# Patient Record
Sex: MALE | Race: WHITE | Employment: FULL TIME | URBAN - NONMETROPOLITAN AREA
[De-identification: names, ages, dates, MRNs, and addresses within clinical notes are randomized per-mention and may not be internally consistent; named-entity substitution may affect disease eponyms.]

---

## 2017-01-31 ENCOUNTER — TELEPHONE (OUTPATIENT)
Dept: FAMILY MEDICINE CLINIC | Facility: CLINIC | Age: 52
End: 2017-01-31

## 2017-02-01 ENCOUNTER — OFFICE VISIT (OUTPATIENT)
Dept: FAMILY MEDICINE CLINIC | Facility: CLINIC | Age: 52
End: 2017-02-01

## 2017-02-01 ENCOUNTER — MED REC SCAN ONLY (OUTPATIENT)
Dept: FAMILY MEDICINE CLINIC | Facility: CLINIC | Age: 52
End: 2017-02-01

## 2017-02-01 VITALS
SYSTOLIC BLOOD PRESSURE: 140 MMHG | WEIGHT: 255 LBS | DIASTOLIC BLOOD PRESSURE: 80 MMHG | TEMPERATURE: 98 F | BODY MASS INDEX: 36.51 KG/M2 | OXYGEN SATURATION: 98 % | HEIGHT: 70 IN | HEART RATE: 76 BPM

## 2017-02-01 DIAGNOSIS — E03.9 ACQUIRED HYPOTHYROIDISM: ICD-10-CM

## 2017-02-01 DIAGNOSIS — M54.50 ACUTE BILATERAL LOW BACK PAIN WITHOUT SCIATICA: Primary | ICD-10-CM

## 2017-02-01 DIAGNOSIS — K42.9 UMBILICAL HERNIA WITHOUT OBSTRUCTION AND WITHOUT GANGRENE: ICD-10-CM

## 2017-02-01 PROCEDURE — 99204 OFFICE O/P NEW MOD 45 MIN: CPT | Performed by: FAMILY MEDICINE

## 2017-02-01 RX ORDER — LEVOTHYROXINE SODIUM 0.15 MG/1
TABLET ORAL
Refills: 3 | COMMUNITY
Start: 2017-01-27 | End: 2017-08-25

## 2017-02-01 NOTE — PROGRESS NOTES
HPI:    Patient ID: Judie Banda is a 47year old male. Patient presents with:  Establish Care  Back Pain: HAD MRI--- WANTS TO DISCUSS  pt on FMLA  HPI \" I want help with my back\", woke up 3 weeks ago  ,\"couldn't get out of bed\", pt  of Dr Ector Malik [facility]: He is alert and oriented to person, place, and time. He has normal strength. No sensory deficit. Coordination and gait normal.   Reflex Scores:       Tricep reflexes are 2+ on the right side and 2+ on the left side.        Bicep reflexes are 2

## 2017-02-01 NOTE — PATIENT INSTRUCTIONS
I reviewed the patient's MRI report. I advised him that there is no evidence of nerve or spinal cord compression.   Discussed back exercises, use of moist heat up to 15 minutes every 4 hours, patient may continue ibuprofen up to 800 mg 3 times daily with f

## 2017-02-10 ENCOUNTER — TELEPHONE (OUTPATIENT)
Dept: FAMILY MEDICINE CLINIC | Facility: CLINIC | Age: 52
End: 2017-02-10

## 2017-02-28 ENCOUNTER — TELEPHONE (OUTPATIENT)
Dept: FAMILY MEDICINE CLINIC | Facility: CLINIC | Age: 52
End: 2017-02-28

## 2017-03-02 ENCOUNTER — MED REC SCAN ONLY (OUTPATIENT)
Dept: FAMILY MEDICINE CLINIC | Facility: CLINIC | Age: 52
End: 2017-03-02

## 2017-03-06 ENCOUNTER — OFFICE VISIT (OUTPATIENT)
Dept: FAMILY MEDICINE CLINIC | Facility: CLINIC | Age: 52
End: 2017-03-06

## 2017-03-06 VITALS
OXYGEN SATURATION: 98 % | WEIGHT: 260 LBS | SYSTOLIC BLOOD PRESSURE: 122 MMHG | HEART RATE: 78 BPM | BODY MASS INDEX: 37 KG/M2 | TEMPERATURE: 98 F | DIASTOLIC BLOOD PRESSURE: 80 MMHG

## 2017-03-06 DIAGNOSIS — G89.29 CHRONIC BILATERAL LOW BACK PAIN WITHOUT SCIATICA: Primary | ICD-10-CM

## 2017-03-06 DIAGNOSIS — M54.50 CHRONIC BILATERAL LOW BACK PAIN WITHOUT SCIATICA: Primary | ICD-10-CM

## 2017-03-06 PROCEDURE — 99213 OFFICE O/P EST LOW 20 MIN: CPT | Performed by: FAMILY MEDICINE

## 2017-03-06 NOTE — PROGRESS NOTES
HPI:    Patient ID: John Reyes is a 47year old male. Patient presents with:  Low Back Pain: F/U FROM PT--- RELEASE BACK TO WORK--    HPI patient was seen 2/1/17 for low back pain.   He had a previous MRI that did not show any evidence of nerve imping placed in this encounter.        Meds This Visit:  No prescriptions requested or ordered in this encounter    Imaging & Referrals:  None       #[id number]

## 2017-03-06 NOTE — PATIENT INSTRUCTIONS
Patient strongly encouraged to continue daily home exercise program to prevent back problems in the future. Reviewed proper lifting mechanics and posture. Note written for return to work without restrictions.   Follow-up as needed

## 2017-08-23 ENCOUNTER — TELEPHONE (OUTPATIENT)
Dept: FAMILY MEDICINE CLINIC | Facility: CLINIC | Age: 52
End: 2017-08-23

## 2017-08-23 DIAGNOSIS — Z13.220 SCREENING FOR CHOLESTEROL LEVEL: ICD-10-CM

## 2017-08-23 DIAGNOSIS — Z12.5 PROSTATE CANCER SCREENING: ICD-10-CM

## 2017-08-23 DIAGNOSIS — Z13.228 SCREENING FOR METABOLIC DISORDER: ICD-10-CM

## 2017-08-23 DIAGNOSIS — E03.9 ACQUIRED HYPOTHYROIDISM: Primary | ICD-10-CM

## 2017-08-23 NOTE — TELEPHONE ENCOUNTER
No orders in chart. Last OV 3/6/17.     What labs do you want?? Please place orders for labwork on 8/24

## 2017-08-23 NOTE — TELEPHONE ENCOUNTER
LEVOTHYROXINE-  PATIENT IS SCHEDULED FOR BLOOD DRAW ON 8/24-  NO ORDERS IN SYSTEM    PATIENT HAS REQUESTED 30 DAY SUPPLY WITH 11 REFILLS

## 2017-08-24 ENCOUNTER — APPOINTMENT (OUTPATIENT)
Dept: FAMILY MEDICINE CLINIC | Facility: CLINIC | Age: 52
End: 2017-08-24

## 2017-08-24 DIAGNOSIS — Z12.5 PROSTATE CANCER SCREENING: ICD-10-CM

## 2017-08-24 DIAGNOSIS — Z13.220 SCREENING FOR CHOLESTEROL LEVEL: ICD-10-CM

## 2017-08-24 DIAGNOSIS — E03.9 ACQUIRED HYPOTHYROIDISM: ICD-10-CM

## 2017-08-24 DIAGNOSIS — Z13.228 SCREENING FOR METABOLIC DISORDER: ICD-10-CM

## 2017-08-24 PROCEDURE — 80061 LIPID PANEL: CPT | Performed by: FAMILY MEDICINE

## 2017-08-24 PROCEDURE — 84439 ASSAY OF FREE THYROXINE: CPT | Performed by: FAMILY MEDICINE

## 2017-08-24 PROCEDURE — 36415 COLL VENOUS BLD VENIPUNCTURE: CPT | Performed by: FAMILY MEDICINE

## 2017-08-24 PROCEDURE — 80053 COMPREHEN METABOLIC PANEL: CPT | Performed by: FAMILY MEDICINE

## 2017-08-24 PROCEDURE — 84153 ASSAY OF PSA TOTAL: CPT | Performed by: FAMILY MEDICINE

## 2017-08-24 PROCEDURE — 84443 ASSAY THYROID STIM HORMONE: CPT | Performed by: FAMILY MEDICINE

## 2018-02-13 ENCOUNTER — APPOINTMENT (OUTPATIENT)
Dept: LAB | Age: 53
End: 2018-02-13
Attending: FAMILY MEDICINE
Payer: COMMERCIAL

## 2018-02-13 DIAGNOSIS — E03.9 HYPOTHYROIDISM, UNSPECIFIED TYPE: ICD-10-CM

## 2018-02-13 DIAGNOSIS — R74.8 ELEVATED LIVER ENZYMES: ICD-10-CM

## 2018-02-13 LAB
ALT SERPL-CCNC: 76 U/L (ref 17–63)
AST SERPL-CCNC: 49 U/L (ref 15–41)
TSI SER-ACNC: 3.09 MIU/ML (ref 0.35–5.5)

## 2018-02-13 PROCEDURE — 84450 TRANSFERASE (AST) (SGOT): CPT | Performed by: FAMILY MEDICINE

## 2018-02-13 PROCEDURE — 84460 ALANINE AMINO (ALT) (SGPT): CPT | Performed by: FAMILY MEDICINE

## 2018-02-13 PROCEDURE — 36415 COLL VENOUS BLD VENIPUNCTURE: CPT | Performed by: FAMILY MEDICINE

## 2018-02-13 PROCEDURE — 84443 ASSAY THYROID STIM HORMONE: CPT | Performed by: FAMILY MEDICINE

## 2018-02-14 ENCOUNTER — TELEPHONE (OUTPATIENT)
Dept: FAMILY MEDICINE CLINIC | Facility: CLINIC | Age: 53
End: 2018-02-14

## 2018-02-14 DIAGNOSIS — R74.8 ELEVATED LIVER ENZYMES: ICD-10-CM

## 2018-02-14 DIAGNOSIS — E03.9 HYPOTHYROIDISM, UNSPECIFIED TYPE: Primary | ICD-10-CM

## 2018-02-14 RX ORDER — LEVOTHYROXINE SODIUM 0.15 MG/1
150 TABLET ORAL
Qty: 90 TABLET | Refills: 1 | Status: SHIPPED | OUTPATIENT
Start: 2018-02-14 | End: 2018-08-26

## 2018-02-14 NOTE — TELEPHONE ENCOUNTER
Spoke with Neal @ [facility]. They are NOT able to add Hep B and C tests because they need a gold tube. PC to pt----advised of all results. Asked him to schedule a lab appt for add'l labswork.   Pt states he will not come in for those OR have an ultrasound

## 2018-02-14 NOTE — TELEPHONE ENCOUNTER
[facility]   LAB RESULT   MRN: [id number] Description: Male : 1965   PRINTABLE VERSION (Excludes Result Notes)     AST (SGOT) (Order #[id number]) on 18   Result    AST (SGOT) (Order [id number])   AST (SGOT)   Order: [id number]   Collected: 02/13/18 0801 Release Laura segal C[place] IWGYV: [id number]   02/13/18 1859 Result Lab, Background User Final   Reprint [facility] Order     AST (SGOT) (Order #[id number]) on 2/13/18   Reprint [facility] Order     AST (SGOT) (Order #[id number]) on 2/13/18   Repri

## 2018-03-20 ENCOUNTER — PATIENT OUTREACH (OUTPATIENT)
Dept: FAMILY MEDICINE CLINIC | Facility: CLINIC | Age: 53
End: 2018-03-20

## 2018-06-25 ENCOUNTER — OFFICE VISIT (OUTPATIENT)
Dept: FAMILY MEDICINE CLINIC | Facility: CLINIC | Age: 53
End: 2018-06-25

## 2018-06-25 VITALS
SYSTOLIC BLOOD PRESSURE: 120 MMHG | BODY MASS INDEX: 38.35 KG/M2 | RESPIRATION RATE: 16 BRPM | HEART RATE: 68 BPM | HEIGHT: 68.5 IN | DIASTOLIC BLOOD PRESSURE: 82 MMHG | TEMPERATURE: 98 F | WEIGHT: 256 LBS

## 2018-06-25 DIAGNOSIS — E66.09 CLASS 2 OBESITY DUE TO EXCESS CALORIES WITHOUT SERIOUS COMORBIDITY WITH BODY MASS INDEX (BMI) OF 38.0 TO 38.9 IN ADULT: ICD-10-CM

## 2018-06-25 DIAGNOSIS — K76.0 FATTY LIVER: Primary | ICD-10-CM

## 2018-06-25 DIAGNOSIS — E03.9 HYPOTHYROIDISM, UNSPECIFIED TYPE: ICD-10-CM

## 2018-06-25 PROBLEM — E66.812 CLASS 2 OBESITY DUE TO EXCESS CALORIES WITHOUT SERIOUS COMORBIDITY WITH BODY MASS INDEX (BMI) OF 38.0 TO 38.9 IN ADULT: Status: ACTIVE | Noted: 2018-06-25

## 2018-06-25 PROCEDURE — 99214 OFFICE O/P EST MOD 30 MIN: CPT | Performed by: FAMILY MEDICINE

## 2018-06-25 NOTE — PROGRESS NOTES
Deb Reyes is a 47year old male.   HPI:   Neno Payne is here for get established visit, he has some thyroid issues, and also had a back issue a year ago, had imaging and had some bulging discs, had therapy, there  WAS DOING BETTER. , he has a bump in the calories without serious comorbidity with body mass index (bmi) of 38.0 to 38.9 in adult    HE NEEDS TO CUT OUT THE LITER OF [place] HE DRINKS DAILY and will liklely lose 10 pounds  We talked about cutting out the garbage in his diet and he may ose a

## 2018-07-03 ENCOUNTER — TELEPHONE (OUTPATIENT)
Dept: FAMILY MEDICINE CLINIC | Facility: CLINIC | Age: 53
End: 2018-07-03

## 2018-07-03 ENCOUNTER — NURSE ONLY (OUTPATIENT)
Dept: FAMILY MEDICINE CLINIC | Facility: CLINIC | Age: 53
End: 2018-07-03

## 2018-07-03 DIAGNOSIS — E66.09 CLASS 2 OBESITY DUE TO EXCESS CALORIES WITHOUT SERIOUS COMORBIDITY WITH BODY MASS INDEX (BMI) OF 38.0 TO 38.9 IN ADULT: ICD-10-CM

## 2018-07-03 DIAGNOSIS — E03.9 HYPOTHYROIDISM, UNSPECIFIED TYPE: ICD-10-CM

## 2018-07-03 DIAGNOSIS — K76.0 FATTY LIVER: Primary | ICD-10-CM

## 2018-07-03 PROCEDURE — 86706 HEP B SURFACE ANTIBODY: CPT | Performed by: FAMILY MEDICINE

## 2018-07-03 PROCEDURE — 87340 HEPATITIS B SURFACE AG IA: CPT | Performed by: FAMILY MEDICINE

## 2018-07-03 PROCEDURE — 86225 DNA ANTIBODY NATIVE: CPT | Performed by: FAMILY MEDICINE

## 2018-07-03 PROCEDURE — 84443 ASSAY THYROID STIM HORMONE: CPT | Performed by: FAMILY MEDICINE

## 2018-07-03 PROCEDURE — 80061 LIPID PANEL: CPT | Performed by: FAMILY MEDICINE

## 2018-07-03 PROCEDURE — 84439 ASSAY OF FREE THYROXINE: CPT | Performed by: FAMILY MEDICINE

## 2018-07-03 PROCEDURE — 84153 ASSAY OF PSA TOTAL: CPT | Performed by: FAMILY MEDICINE

## 2018-07-03 PROCEDURE — 86803 HEPATITIS C AB TEST: CPT | Performed by: FAMILY MEDICINE

## 2018-07-03 PROCEDURE — 36415 COLL VENOUS BLD VENIPUNCTURE: CPT | Performed by: FAMILY MEDICINE

## 2018-07-03 PROCEDURE — 86235 NUCLEAR ANTIGEN ANTIBODY: CPT | Performed by: FAMILY MEDICINE

## 2018-07-03 PROCEDURE — 86038 ANTINUCLEAR ANTIBODIES: CPT | Performed by: FAMILY MEDICINE

## 2018-07-03 NOTE — TELEPHONE ENCOUNTER
Returned call to Courtney----left a message on her v/m-----we do not have records of colon screening OR spirometry since he has been seeing us.

## 2018-07-03 NOTE — PROGRESS NOTES
1 gold and 1 mint tube collected from L AC using straight needle and 1 attempt    Pt tolerated and was sent home in stable condition

## 2018-07-04 LAB
CHOLEST SMN-MCNC: 203 MG/DL (ref ?–200)
COMPLEXED PSA SERPL-MCNC: 0.55 NG/ML (ref 0.01–4)
FREE T4: 1.1 NG/DL (ref 0.9–1.8)
HBV SURFACE AB SER QL: NONREACTIVE
HBV SURFACE AB SERPL IA-ACNC: <3.1 MIU/ML
HBV SURFACE AG SERPL QL IA: NONREACTIVE
HDLC SERPL-MCNC: 55 MG/DL (ref 45–?)
HDLC SERPL: 3.69 {RATIO} (ref ?–4.97)
HEPATITIS B SURFACE ANTIGEN INDEX: 0.37
HEPATITIS C VIRUS AB INTERPRETATION: NONREACTIVE
LDLC SERPL CALC-MCNC: 121 MG/DL (ref ?–130)
NONHDLC SERPL-MCNC: 148 MG/DL (ref ?–130)
TRIGL SERPL-MCNC: 135 MG/DL (ref ?–150)
TSI SER-ACNC: 1.77 MIU/ML (ref 0.35–5.5)
VLDLC SERPL CALC-MCNC: 27 MG/DL (ref 5–40)

## 2018-07-05 ENCOUNTER — TELEPHONE (OUTPATIENT)
Dept: FAMILY MEDICINE CLINIC | Facility: CLINIC | Age: 53
End: 2018-07-05

## 2018-07-05 DIAGNOSIS — E66.09 CLASS 2 OBESITY DUE TO EXCESS CALORIES WITHOUT SERIOUS COMORBIDITY WITH BODY MASS INDEX (BMI) OF 38.0 TO 38.9 IN ADULT: ICD-10-CM

## 2018-07-05 DIAGNOSIS — K76.0 FATTY LIVER: ICD-10-CM

## 2018-07-05 DIAGNOSIS — E03.9 HYPOTHYROIDISM, UNSPECIFIED TYPE: Primary | ICD-10-CM

## 2018-07-05 NOTE — TELEPHONE ENCOUNTER
Delmy Baeza, Felipe Hayes Nurse             Can notify Jeisonluca Ortiz that his labs actually are not terrible, cholesterol could be better, so diet and exercise as we had discussed.  He does have a fatty liver, which is a direct result of his diet and dangelo

## 2018-07-05 NOTE — TELEPHONE ENCOUNTER
----- Message from Cheryl Gonsalez DO sent at 7/4/2018  7:55 AM CDT -----  CN we add Cathy with 9 reflex?

## 2018-07-06 LAB
ANA SCREEN: POSITIVE
CENTROMERE AUTOAB: <100 AU/ML (ref ?–100)
DSDNA AUTOAB: <100 IU/ML (ref ?–100)
HISTONE AUTOAB: <100 AU/ML (ref ?–100)
JO-1 AUTOAB: <100 AU/ML (ref ?–100)
RNP AUTOAB: <100 AU/ML (ref ?–100)
SCL-70 AUTOAB: <100 AU/ML (ref ?–100)
SM AUTOAB (SMITH): <100 AU/ML (ref ?–100)
SSA AUTOAB: 163 AU/ML (ref ?–100)
SSB AUTOAB: <100 AU/ML (ref ?–100)

## 2018-07-09 ENCOUNTER — TELEPHONE (OUTPATIENT)
Dept: FAMILY MEDICINE CLINIC | Facility: CLINIC | Age: 53
End: 2018-07-09

## 2018-07-09 NOTE — TELEPHONE ENCOUNTER
----- Message from Leatha Green DO sent at 7/9/2018  2:27 PM CDT -----  Can notify Amanda James that one of his tests came back positive that might possibly explain why his Liver function tests are elevated, but I'm not sure it is Positive Enough, to be respon

## 2018-07-09 NOTE — TELEPHONE ENCOUNTER
Pt advised of results and recommendations. Pt verbalized understanding.     PT was given number to schedule with Dr. Jessy Moncada

## 2018-08-08 ENCOUNTER — TELEPHONE (OUTPATIENT)
Dept: FAMILY MEDICINE CLINIC | Facility: CLINIC | Age: 53
End: 2018-08-08

## 2018-08-08 NOTE — TELEPHONE ENCOUNTER
Pt was in Seen at [facility] yesterday. He fell in the bathroom and hit his head. He has been having dizzy spells. He is coming in 8-16-18 to do a f/u and get the stitches in his head removed.

## 2018-08-08 NOTE — TELEPHONE ENCOUNTER
LM for wife to please call back- want to make sure pt is not having any dizziness or lightheadedness at this time.

## 2018-08-16 ENCOUNTER — TELEPHONE (OUTPATIENT)
Dept: FAMILY MEDICINE CLINIC | Facility: CLINIC | Age: 53
End: 2018-08-16

## 2018-08-16 ENCOUNTER — OFFICE VISIT (OUTPATIENT)
Dept: FAMILY MEDICINE CLINIC | Facility: CLINIC | Age: 53
End: 2018-08-16
Payer: COMMERCIAL

## 2018-08-16 VITALS
DIASTOLIC BLOOD PRESSURE: 86 MMHG | OXYGEN SATURATION: 99 % | BODY MASS INDEX: 37 KG/M2 | TEMPERATURE: 98 F | SYSTOLIC BLOOD PRESSURE: 120 MMHG | WEIGHT: 248.81 LBS | HEART RATE: 78 BPM | RESPIRATION RATE: 18 BRPM

## 2018-08-16 DIAGNOSIS — S01.01XA LACERATION OF SCALP, INITIAL ENCOUNTER: Primary | ICD-10-CM

## 2018-08-16 DIAGNOSIS — R55 SYNCOPE, UNSPECIFIED SYNCOPE TYPE: ICD-10-CM

## 2018-08-16 DIAGNOSIS — R42 VERTIGO: ICD-10-CM

## 2018-08-16 DIAGNOSIS — E66.09 CLASS 2 OBESITY DUE TO EXCESS CALORIES WITHOUT SERIOUS COMORBIDITY WITH BODY MASS INDEX (BMI) OF 38.0 TO 38.9 IN ADULT: ICD-10-CM

## 2018-08-16 PROCEDURE — 99214 OFFICE O/P EST MOD 30 MIN: CPT | Performed by: FAMILY MEDICINE

## 2018-08-16 PROCEDURE — 93000 ELECTROCARDIOGRAM COMPLETE: CPT | Performed by: FAMILY MEDICINE

## 2018-08-16 NOTE — PROGRESS NOTES
Tracy Osgood is a 47year old male.   HPI:   Nila Bhatia was sitting in the bathroom trying to have a BM when he thinks he passed out and struck his head and had to have stitches, he did  Not feel well the day prior and earlier, he had no nausea with the dizz this Visit:  No prescriptions requested or ordered in this encounter    Imaging & Consults:  ELECTROCARDIOGRAM, COMPLETE  CARD ECHO STRESS ECHO/REST AND STRESS(CPT=93350/36308 DM [id number])     The patient indicates understanding of these issues and agrees to

## 2018-08-27 RX ORDER — LEVOTHYROXINE SODIUM 0.15 MG/1
TABLET ORAL
Qty: 90 TABLET | Refills: 3 | Status: SHIPPED | OUTPATIENT
Start: 2018-08-27 | End: 2019-01-07 | Stop reason: SDUPTHER

## 2018-09-05 ENCOUNTER — HOSPITAL ENCOUNTER (OUTPATIENT)
Dept: CV DIAGNOSTICS | Age: 53
Discharge: HOME | End: 2018-09-05
Attending: FAMILY MEDICINE
Payer: COMMERCIAL

## 2018-09-05 DIAGNOSIS — E66.09 CLASS 2 OBESITY DUE TO EXCESS CALORIES WITHOUT SERIOUS COMORBIDITY WITH BODY MASS INDEX (BMI) OF 38.0 TO 38.9 IN ADULT: ICD-10-CM

## 2018-09-05 DIAGNOSIS — R55 SYNCOPE, UNSPECIFIED SYNCOPE TYPE: ICD-10-CM

## 2018-09-05 DIAGNOSIS — R42 VERTIGO: ICD-10-CM

## 2018-09-05 PROCEDURE — 93350 STRESS TTE ONLY: CPT | Performed by: FAMILY MEDICINE

## 2018-09-05 PROCEDURE — 93017 CV STRESS TEST TRACING ONLY: CPT | Performed by: FAMILY MEDICINE

## 2018-09-05 PROCEDURE — 93018 CV STRESS TEST I&R ONLY: CPT | Performed by: FAMILY MEDICINE

## 2018-09-07 ENCOUNTER — TELEPHONE (OUTPATIENT)
Dept: FAMILY MEDICINE CLINIC | Facility: CLINIC | Age: 53
End: 2018-09-07

## 2018-09-07 NOTE — TELEPHONE ENCOUNTER
----- Message from Terry Jacobs DO sent at 9/7/2018  8:15 AM CDT -----  Can notify Sher Paul, his stress test was negative, but we need to do some additional testing, can he set up a follow up appt to go over plan?

## 2018-10-18 ENCOUNTER — IMMUNIZATION (OUTPATIENT)
Dept: FAMILY MEDICINE CLINIC | Facility: CLINIC | Age: 53
End: 2018-10-18
Payer: COMMERCIAL

## 2018-10-18 DIAGNOSIS — Z23 NEED FOR VACCINATION: ICD-10-CM

## 2018-10-18 PROCEDURE — 90686 IIV4 VACC NO PRSV 0.5 ML IM: CPT | Performed by: FAMILY MEDICINE

## 2018-10-18 PROCEDURE — 90471 IMMUNIZATION ADMIN: CPT | Performed by: FAMILY MEDICINE

## 2019-01-07 ENCOUNTER — OFFICE VISIT (OUTPATIENT)
Dept: FAMILY MEDICINE CLINIC | Facility: CLINIC | Age: 54
End: 2019-01-07
Payer: COMMERCIAL

## 2019-01-07 VITALS
HEIGHT: 70 IN | TEMPERATURE: 99 F | RESPIRATION RATE: 20 BRPM | SYSTOLIC BLOOD PRESSURE: 132 MMHG | WEIGHT: 254.19 LBS | BODY MASS INDEX: 36.39 KG/M2 | HEART RATE: 80 BPM | DIASTOLIC BLOOD PRESSURE: 84 MMHG

## 2019-01-07 DIAGNOSIS — R55 SYNCOPE, UNSPECIFIED SYNCOPE TYPE: Primary | ICD-10-CM

## 2019-01-07 DIAGNOSIS — E66.09 CLASS 2 OBESITY DUE TO EXCESS CALORIES WITHOUT SERIOUS COMORBIDITY WITH BODY MASS INDEX (BMI) OF 38.0 TO 38.9 IN ADULT: ICD-10-CM

## 2019-01-07 PROCEDURE — 99214 OFFICE O/P EST MOD 30 MIN: CPT | Performed by: FAMILY MEDICINE

## 2019-01-07 RX ORDER — CITALOPRAM 10 MG/1
10 TABLET ORAL DAILY
Qty: 30 TABLET | Refills: 1 | Status: SHIPPED | OUTPATIENT
Start: 2019-01-07 | End: 2020-11-23

## 2019-01-07 RX ORDER — LEVOTHYROXINE SODIUM 0.15 MG/1
1 TABLET ORAL DAILY
Refills: 2 | COMMUNITY
Start: 2018-12-12 | End: 2019-05-24

## 2019-01-07 NOTE — PROGRESS NOTES
John Reyes is a 47year old male. HPI:   Adam Tereza is here for follow up on his syncope,?  He describes what sounds like possibly an anxiety attack,he starts to shake and then when he lays down before he feels like he might  Pass, out he is then able cyanosis, clubbing or edema    ASSESSMENT AND PLAN:     Syncope, unspecified syncope type  (primary encounter diagnosis)  Class 2 obesity due to excess calories without serious comorbidity with body mass index (bmi) of 38.0 to 38.9 in adult     if these ep

## 2019-02-25 ENCOUNTER — OFFICE VISIT (OUTPATIENT)
Dept: FAMILY MEDICINE CLINIC | Facility: CLINIC | Age: 54
End: 2019-02-25
Payer: COMMERCIAL

## 2019-02-25 VITALS
RESPIRATION RATE: 16 BRPM | HEIGHT: 70 IN | WEIGHT: 254.81 LBS | BODY MASS INDEX: 36.48 KG/M2 | TEMPERATURE: 99 F | DIASTOLIC BLOOD PRESSURE: 80 MMHG | HEART RATE: 80 BPM | SYSTOLIC BLOOD PRESSURE: 118 MMHG

## 2019-02-25 DIAGNOSIS — Z00.00 ROUTINE HEALTH MAINTENANCE: Primary | ICD-10-CM

## 2019-02-25 PROCEDURE — 99396 PREV VISIT EST AGE 40-64: CPT | Performed by: FAMILY MEDICINE

## 2019-02-25 NOTE — PROGRESS NOTES
Concepcion Nair is a 49year old male who presents for a complete physical exam.   HPI:   Pt complains of nothing at this time, he is due for labs and also colon cancer screening, but doesn;t want the \"funny stuff\"?   .I explained to him that the proces tobacco: Never Used    Alcohol use: No    Drug use: No     Occ: works at [employer]. : . Children: 1. Exercise: none.   Diet: doesn't watch     REVIEW OF SYSTEMS:   GENERAL: feels well otherwise  SKIN: denies any unusual skin lesions  EY fasting Lipids, CMP, CBC and PSA. Pt referred for screening colonoscopy. Pt info handouts given for: exercise, low fat diet, testicular self exam and prostate cancer screening. The patient indicates understanding of these issues and agrees to the plan.   Sven Toribio

## 2019-03-23 ENCOUNTER — TELEPHONE (OUTPATIENT)
Dept: FAMILY MEDICINE CLINIC | Facility: CLINIC | Age: 54
End: 2019-03-23

## 2019-03-23 ENCOUNTER — PATIENT OUTREACH (OUTPATIENT)
Dept: FAMILY MEDICINE CLINIC | Facility: CLINIC | Age: 54
End: 2019-03-23

## 2019-03-23 ENCOUNTER — NURSE ONLY (OUTPATIENT)
Dept: FAMILY MEDICINE CLINIC | Facility: CLINIC | Age: 54
End: 2019-03-23
Payer: COMMERCIAL

## 2019-03-23 DIAGNOSIS — Z12.11 SCREENING FOR MALIGNANT NEOPLASM OF COLON: Primary | ICD-10-CM

## 2019-03-23 DIAGNOSIS — Z00.00 ROUTINE HEALTH MAINTENANCE: ICD-10-CM

## 2019-03-23 LAB
ALBUMIN SERPL-MCNC: 3.9 G/DL (ref 3.4–5)
ALBUMIN/GLOB SERPL: 1 {RATIO} (ref 1–2)
ALP LIVER SERPL-CCNC: 101 U/L (ref 45–117)
ALT SERPL-CCNC: 77 U/L (ref 16–61)
ANION GAP SERPL CALC-SCNC: 6 MMOL/L (ref 0–18)
AST SERPL-CCNC: 44 U/L (ref 15–37)
BASOPHILS # BLD AUTO: 0.03 X10(3) UL (ref 0–0.2)
BASOPHILS NFR BLD AUTO: 0.4 %
BILIRUB SERPL-MCNC: 0.6 MG/DL (ref 0.1–2)
BUN BLD-MCNC: 17 MG/DL (ref 7–18)
BUN/CREAT SERPL: 17.7 (ref 10–20)
CALCIUM BLD-MCNC: 8.9 MG/DL (ref 8.5–10.1)
CHLORIDE SERPL-SCNC: 107 MMOL/L (ref 98–107)
CHOLEST SMN-MCNC: 204 MG/DL (ref ?–200)
CO2 SERPL-SCNC: 28 MMOL/L (ref 21–32)
COMPLEXED PSA SERPL-MCNC: 0.75 NG/ML (ref ?–4)
CREAT BLD-MCNC: 0.96 MG/DL (ref 0.7–1.3)
DEPRECATED RDW RBC AUTO: 43.7 FL (ref 35.1–46.3)
EOSINOPHIL # BLD AUTO: 0.35 X10(3) UL (ref 0–0.7)
EOSINOPHIL NFR BLD AUTO: 4.8 %
ERYTHROCYTE [DISTWIDTH] IN BLOOD BY AUTOMATED COUNT: 13.1 % (ref 11–15)
GLOBULIN PLAS-MCNC: 3.9 G/DL (ref 2.8–4.4)
GLUCOSE BLD-MCNC: 91 MG/DL (ref 70–99)
HCT VFR BLD AUTO: 47.7 % (ref 39–53)
HDLC SERPL-MCNC: 50 MG/DL (ref 40–59)
HGB BLD-MCNC: 16.1 G/DL (ref 13–17.5)
IMM GRANULOCYTES # BLD AUTO: 0.03 X10(3) UL (ref 0–1)
IMM GRANULOCYTES NFR BLD: 0.4 %
LDLC SERPL CALC-MCNC: 127 MG/DL (ref ?–100)
LYMPHOCYTES # BLD AUTO: 2.58 X10(3) UL (ref 1–4)
LYMPHOCYTES NFR BLD AUTO: 35.4 %
M PROTEIN MFR SERPL ELPH: 7.8 G/DL (ref 6.4–8.2)
MCH RBC QN AUTO: 30.8 PG (ref 26–34)
MCHC RBC AUTO-ENTMCNC: 33.8 G/DL (ref 31–37)
MCV RBC AUTO: 91.4 FL (ref 80–100)
MONOCYTES # BLD AUTO: 0.56 X10(3) UL (ref 0.1–1)
MONOCYTES NFR BLD AUTO: 7.7 %
NEUTROPHILS # BLD AUTO: 3.73 X10 (3) UL (ref 1.5–7.7)
NEUTROPHILS # BLD AUTO: 3.73 X10(3) UL (ref 1.5–7.7)
NEUTROPHILS NFR BLD AUTO: 51.3 %
NONHDLC SERPL-MCNC: 154 MG/DL (ref ?–130)
OSMOLALITY SERPL CALC.SUM OF ELEC: 293 MOSM/KG (ref 275–295)
PLATELET # BLD AUTO: 237 10(3)UL (ref 150–450)
POTASSIUM SERPL-SCNC: 3.9 MMOL/L (ref 3.5–5.1)
RBC # BLD AUTO: 5.22 X10(6)UL (ref 4.3–5.7)
SODIUM SERPL-SCNC: 141 MMOL/L (ref 136–145)
T4 FREE SERPL-MCNC: 1.2 NG/DL (ref 0.8–1.7)
TRIGL SERPL-MCNC: 137 MG/DL (ref 30–149)
TSI SER-ACNC: 2.3 MIU/ML (ref 0.36–3.74)
VLDLC SERPL CALC-MCNC: 27 MG/DL (ref 0–30)
WBC # BLD AUTO: 7.3 X10(3) UL (ref 4–11)

## 2019-03-23 PROCEDURE — 36415 COLL VENOUS BLD VENIPUNCTURE: CPT | Performed by: FAMILY MEDICINE

## 2019-03-23 PROCEDURE — 84439 ASSAY OF FREE THYROXINE: CPT | Performed by: FAMILY MEDICINE

## 2019-03-23 PROCEDURE — 84153 ASSAY OF PSA TOTAL: CPT | Performed by: FAMILY MEDICINE

## 2019-03-23 PROCEDURE — 80061 LIPID PANEL: CPT | Performed by: FAMILY MEDICINE

## 2019-03-23 PROCEDURE — 80050 GENERAL HEALTH PANEL: CPT | Performed by: FAMILY MEDICINE

## 2019-03-23 NOTE — TELEPHONE ENCOUNTER
Patient presented today for lab draw. Would like Dr. Horacio Vazquez to know that he had another episode yesterday. Did not pass out but he felt dizzy and his lower back completely tensed up. States the episode scared his boss so much that his boss drove him home.

## 2019-03-26 ENCOUNTER — TELEPHONE (OUTPATIENT)
Dept: FAMILY MEDICINE CLINIC | Facility: CLINIC | Age: 54
End: 2019-03-26

## 2019-03-26 NOTE — TELEPHONE ENCOUNTER
----- Message from Coleen Doan DO sent at 3/26/2019  9:34 AM CDT -----  ValFreeWavz looked pretty good lipids were actually pretty good he could work on his diet a little more to get his total cholesterol down, he has  Excellent kidney

## 2019-04-03 NOTE — TELEPHONE ENCOUNTER
LM for pt to call back    Have attempted to contact pt 3 times- letter sent to pt to call our office    Closing call

## 2019-04-22 ENCOUNTER — TELEPHONE (OUTPATIENT)
Dept: FAMILY MEDICINE CLINIC | Facility: CLINIC | Age: 54
End: 2019-04-22

## 2019-05-24 ENCOUNTER — TELEPHONE (OUTPATIENT)
Dept: FAMILY MEDICINE CLINIC | Facility: CLINIC | Age: 54
End: 2019-05-24

## 2019-05-24 DIAGNOSIS — E03.9 HYPOTHYROIDISM, UNSPECIFIED TYPE: Primary | ICD-10-CM

## 2019-05-24 RX ORDER — LEVOTHYROXINE SODIUM 0.15 MG/1
150 TABLET ORAL DAILY
Qty: 10 TABLET | Refills: 0 | Status: SHIPPED | OUTPATIENT
Start: 2019-05-24 | End: 2019-07-01

## 2019-05-24 NOTE — TELEPHONE ENCOUNTER
Medication sent to pharmacy. LM for patient's wife advising med sent.  Ok per [employer] form

## 2019-05-24 NOTE — TELEPHONE ENCOUNTER
Coming in for labs on 6/29. No orders in Epic. Last labs were done in 3/26/19. Patient never called back for results.      Future Appointments   Date Time Provider Kya Navarro   6/29/2019  8:15 AM  [facility],2Nd Floor [facility] Anette Bonner

## 2019-05-24 NOTE — TELEPHONE ENCOUNTER
Patient is coming in on the 29th for lab work but won't have enough thyroid meds to make it until then. Can we call in a refill to [facility] [place]? Please call wife back.

## 2019-06-29 ENCOUNTER — NURSE ONLY (OUTPATIENT)
Dept: FAMILY MEDICINE CLINIC | Facility: CLINIC | Age: 54
End: 2019-06-29
Payer: COMMERCIAL

## 2019-06-29 DIAGNOSIS — E03.9 HYPOTHYROIDISM, UNSPECIFIED TYPE: ICD-10-CM

## 2019-06-29 PROCEDURE — 84439 ASSAY OF FREE THYROXINE: CPT | Performed by: FAMILY MEDICINE

## 2019-06-29 PROCEDURE — 36415 COLL VENOUS BLD VENIPUNCTURE: CPT | Performed by: FAMILY MEDICINE

## 2019-06-29 PROCEDURE — 84443 ASSAY THYROID STIM HORMONE: CPT | Performed by: FAMILY MEDICINE

## 2019-06-29 NOTE — PROGRESS NOTES
Pt here for thyroid labs-   Mint tube drawn from left arm with straight needle x1. Pt bill well.  Pt left the clinic in stable condition      Pt asks for his levothyroxine to be filled to [facility]    He also asks if he can do his blood work once a

## 2019-07-01 ENCOUNTER — TELEPHONE (OUTPATIENT)
Dept: FAMILY MEDICINE CLINIC | Facility: CLINIC | Age: 54
End: 2019-07-01

## 2019-07-01 RX ORDER — LEVOTHYROXINE SODIUM 0.15 MG/1
150 TABLET ORAL DAILY
Qty: 90 TABLET | Refills: 3 | Status: SHIPPED | OUTPATIENT
Start: 2019-07-01 | End: 2020-05-04

## 2019-07-01 NOTE — TELEPHONE ENCOUNTER
Pt is asking for refill of LEvothyroxine    Per CC chart by Dr. Ricci mooney to refill for a year    Once a year is fine, and ca refill for a year

## 2019-07-01 NOTE — TELEPHONE ENCOUNTER
----- Message from Caty Daniel DO sent at 7/1/2019 12:28 PM CDT -----  Keep th dose the same and recall in 1 year

## 2019-07-24 ENCOUNTER — TELEPHONE (OUTPATIENT)
Dept: FAMILY MEDICINE CLINIC | Facility: CLINIC | Age: 54
End: 2019-07-24

## 2019-07-24 NOTE — TELEPHONE ENCOUNTER
Levothyroxine 150 mcg was refilled on 7/1/19 for #90 with 3 refills. Confirmed with [facility] staff Moise Carvalho) this was picked up on 7/23/19 for #30. Requested that this be prepared for the patient. She will submit for refill.

## 2019-08-19 ENCOUNTER — TELEPHONE (OUTPATIENT)
Dept: FAMILY MEDICINE CLINIC | Facility: CLINIC | Age: 54
End: 2019-08-19

## 2019-08-19 NOTE — TELEPHONE ENCOUNTER
Levothyroxine Sodium 150 MCG Oral Tab   for 6 month   [id number] - [place], [address] [place] [address] ([address]), [phone], [phone]

## 2019-09-23 ENCOUNTER — TELEPHONE (OUTPATIENT)
Dept: FAMILY MEDICINE CLINIC | Facility: CLINIC | Age: 54
End: 2019-09-23

## 2019-09-23 NOTE — TELEPHONE ENCOUNTER
Well have him take eieher 200 mg of magnesium at night, OR, he ca drink a bottle of tonic water before bedtime, the quinine in it helps to stop cramps

## 2019-09-23 NOTE — TELEPHONE ENCOUNTER
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 The cramps usually come at night. Is there something that he can take for this. If so then can you send the RX to [address]

## 2019-09-23 NOTE — TELEPHONE ENCOUNTER
Wife states pt is is getting cramps in R leg- only happens at night- almost every night. Wife states pt has no tenerness, swelling or warmth associated with cramping. Wife states Pt takes Nyquil at night to help him fall asleep.   Wife states when he

## 2019-10-04 ENCOUNTER — IMMUNIZATION (OUTPATIENT)
Dept: FAMILY MEDICINE CLINIC | Facility: CLINIC | Age: 54
End: 2019-10-04
Payer: COMMERCIAL

## 2019-10-04 DIAGNOSIS — Z23 NEED FOR VACCINATION: ICD-10-CM

## 2019-10-04 PROCEDURE — 90686 IIV4 VACC NO PRSV 0.5 ML IM: CPT | Performed by: FAMILY MEDICINE

## 2019-10-04 PROCEDURE — 90471 IMMUNIZATION ADMIN: CPT | Performed by: FAMILY MEDICINE

## 2019-12-28 ENCOUNTER — OFFICE VISIT (OUTPATIENT)
Dept: FAMILY MEDICINE CLINIC | Facility: CLINIC | Age: 54
End: 2019-12-28
Payer: COMMERCIAL

## 2019-12-28 ENCOUNTER — TELEPHONE (OUTPATIENT)
Dept: FAMILY MEDICINE CLINIC | Facility: CLINIC | Age: 54
End: 2019-12-28

## 2019-12-28 VITALS
SYSTOLIC BLOOD PRESSURE: 130 MMHG | TEMPERATURE: 98 F | BODY MASS INDEX: 36 KG/M2 | RESPIRATION RATE: 16 BRPM | DIASTOLIC BLOOD PRESSURE: 78 MMHG | HEART RATE: 78 BPM | OXYGEN SATURATION: 98 % | WEIGHT: 254 LBS

## 2019-12-28 DIAGNOSIS — J40 BRONCHITIS: Primary | ICD-10-CM

## 2019-12-28 PROCEDURE — 99213 OFFICE O/P EST LOW 20 MIN: CPT | Performed by: NURSE PRACTITIONER

## 2019-12-28 RX ORDER — ALBUTEROL SULFATE 90 UG/1
2 AEROSOL, METERED RESPIRATORY (INHALATION) EVERY 4 HOURS PRN
Qty: 1 INHALER | Refills: 0 | Status: SHIPPED | OUTPATIENT
Start: 2019-12-28 | End: 2020-11-23

## 2019-12-28 RX ORDER — PREDNISONE 20 MG/1
20 TABLET ORAL DAILY
Qty: 5 TABLET | Refills: 0 | Status: SHIPPED | OUTPATIENT
Start: 2019-12-28 | End: 2020-01-02

## 2019-12-28 NOTE — TELEPHONE ENCOUNTER
Patient advised. Verbalized understanding. Advised if worsening symptoms or DAISY or SOB needs to go to ER or IC. Verbalized understanding.  Offered to make appt with Dr Teetee Lino for next week-states he will call back

## 2019-12-28 NOTE — PROGRESS NOTES
CHIEF COMPLAINT:   Patient presents with:  Cough: x 1 week. no congestion/fever        HPI:   Aaron Perez is a 49year old male who presents for cough for  1  weeks. Cough started gradually and is described as dry.  Patient denies history of bronchit HENT: Atraumatic, normocephalic. TM's clear bilaterally. Nostrils patent, nasal mucosa pink and non-inflamed. No erythema of the throat. NECK: supple, non-tender. LUNGS: Normal respiratory rate. Normal effort. dry cough. no wheezing.  No rales or crac · Chest discomfort  · Shortness of breath  · Mild fever  A second infection, this time due to bacteria, may then occur. And airways irritated by allergens or smoke are more likely to get infected.   Making a diagnosis  A physical exam, health history, and c Most cases of bronchitis are caused by cold or flu viruses. They don’t need antibiotics to treat them, even if your mucus is thick and green or yellow.  Antibiotics don’t treat viral illness and antibiotics have not been shown to have any benefit in cases o Inside healthy lungs    Air travels in and out of the lungs through the airways. The linings of these airways produce sticky mucus. This mucus traps particles that enter the lungs. Tiny structures called cilia then sweep the particles out of the airways.

## 2019-12-28 NOTE — TELEPHONE ENCOUNTER
Pt called, has had a bad cough for about a week and is wondering if there is anything we can call in for him instead of pt having to come in and see Dr. Melquiades Patton.   Please call pt at [phone]

## 2019-12-28 NOTE — TELEPHONE ENCOUNTER
Call from patient. States he has had a cough for over a week and wanted to know if something could be called in. Advised that DS is out of the office and we would need to see him prior to calling anything in.  Advised can go to  or [facility] over the weekend if

## 2019-12-28 NOTE — TELEPHONE ENCOUNTER
If he has post nasal drip/congestion I would do sudafed in am, benadryl at bedtime. Can do cough drops (honey or menthol), run humidifer, tea with honey.   If feels like has congestion in chest htat isn't coming up can try plain mucinex to help bring it up

## 2020-03-02 ENCOUNTER — OFFICE VISIT (OUTPATIENT)
Dept: FAMILY MEDICINE CLINIC | Facility: CLINIC | Age: 55
End: 2020-03-02
Payer: COMMERCIAL

## 2020-03-02 ENCOUNTER — TELEPHONE (OUTPATIENT)
Dept: FAMILY MEDICINE CLINIC | Facility: CLINIC | Age: 55
End: 2020-03-02

## 2020-03-02 VITALS
OXYGEN SATURATION: 98 % | HEART RATE: 84 BPM | RESPIRATION RATE: 24 BRPM | TEMPERATURE: 98 F | WEIGHT: 268 LBS | HEIGHT: 70 IN | SYSTOLIC BLOOD PRESSURE: 120 MMHG | BODY MASS INDEX: 38.37 KG/M2 | DIASTOLIC BLOOD PRESSURE: 82 MMHG

## 2020-03-02 DIAGNOSIS — R06.01 ORTHOPNEA: ICD-10-CM

## 2020-03-02 DIAGNOSIS — J98.01 BRONCHOSPASM: Primary | ICD-10-CM

## 2020-03-02 DIAGNOSIS — R05.9 COUGH: ICD-10-CM

## 2020-03-02 PROCEDURE — 99214 OFFICE O/P EST MOD 30 MIN: CPT | Performed by: FAMILY MEDICINE

## 2020-03-02 RX ORDER — METHYLPREDNISOLONE 4 MG/1
TABLET ORAL
Qty: 1 KIT | Refills: 0 | Status: SHIPPED | OUTPATIENT
Start: 2020-03-02 | End: 2020-08-22

## 2020-03-02 NOTE — TELEPHONE ENCOUNTER
Pt's spouse Sania Lemus called PT has been wheezing and cough for awhile now. It is keeping him up at night. Pt wants to know if  could send a script in for PT?    Would like sent to [facility] in [place]

## 2020-03-02 NOTE — TELEPHONE ENCOUNTER
Pt coming in at 540 per DS    Future Appointments   Date Time Provider Kya Navarro   3/2/2020  5:40 PM [place], [facility] MARGY Maguire

## 2020-03-03 NOTE — PROGRESS NOTES
HPI:   Ralph Armendariz is a 48year old male who presents for upper respiratory symptoms for  2  months. Patient reports congestion, cough with clear colored sputum, cough is keeping pt up at night, wheezing.  He has gained about 14 pounds, he was given a GENERAL: well developed, well nourished,in no apparent distress  SKIN: no rashes,no suspicious lesions  EYES:PERRLA, EOMI, normal optic disk,conjunctiva are clear  HEENT: atraumatic, normocephalic,ears and throat are clear  NECK: supple,no adenopathy,no

## 2020-05-04 RX ORDER — LEVOTHYROXINE SODIUM 0.15 MG/1
150 TABLET ORAL DAILY
Qty: 90 TABLET | Refills: 3 | Status: SHIPPED | OUTPATIENT
Start: 2020-05-04 | End: 2020-12-08

## 2020-05-04 NOTE — TELEPHONE ENCOUNTER
LOV: 3/2/30   Last Refill:7/1/19 #90 3 RF    No future appointments.     Lab Results   Component Value Date    T4F 1.0 06/29/2019    TSH 1.060 06/29/2019

## 2020-05-04 NOTE — TELEPHONE ENCOUNTER
Spouse called, asking for a refill of pt's thyroid medication. Spouse didn't know the name of the medication.      [facility] [address] [place] [address] [place] [address] ([address]), [phone], [phone]

## 2020-08-20 ENCOUNTER — TELEPHONE (OUTPATIENT)
Dept: FAMILY MEDICINE CLINIC | Facility: CLINIC | Age: 55
End: 2020-08-20

## 2020-08-20 NOTE — TELEPHONE ENCOUNTER
Pt's wife called he woke up from sleeping last night was feeling weird and was  screaming out to high wife \"dont leave me I feel weird\" and he was sweaty, wants to  throw up. He didn't want her to call 911. Said his BP was fine bc she checked it.  [place] we

## 2020-08-20 NOTE — TELEPHONE ENCOUNTER
Wife states last week he was feeling really faint. Pt woke up in the middle of the night- was sitting on the toilet sweatying and \"screaming don't leave me\" pt was super sweaty and clammy. Pt then fell to the floor. Pt had cereal for dinner.   Josiah Duarte

## 2020-08-22 ENCOUNTER — HOSPITAL ENCOUNTER (OUTPATIENT)
Age: 55
Discharge: OTHER INSTITUTION | End: 2020-08-22
Payer: COMMERCIAL

## 2020-08-22 VITALS
HEART RATE: 67 BPM | RESPIRATION RATE: 18 BRPM | SYSTOLIC BLOOD PRESSURE: 157 MMHG | TEMPERATURE: 98 F | DIASTOLIC BLOOD PRESSURE: 85 MMHG | OXYGEN SATURATION: 96 %

## 2020-08-22 DIAGNOSIS — R61 DIAPHORESIS: ICD-10-CM

## 2020-08-22 DIAGNOSIS — R55 SYNCOPE, NEAR: Primary | ICD-10-CM

## 2020-08-22 PROCEDURE — 99202 OFFICE O/P NEW SF 15 MIN: CPT | Performed by: PHYSICIAN ASSISTANT

## 2020-08-22 PROCEDURE — 93000 ELECTROCARDIOGRAM COMPLETE: CPT | Performed by: PHYSICIAN ASSISTANT

## 2020-08-22 NOTE — ED INITIAL ASSESSMENT (HPI)
Wednesday Pt c/o \"feeling odd\", sweating, nausea. Pt states this continues, PCP instructed Pt to go to ER.     Denies: DM, cardiac issues, Pt denies LOC

## 2020-08-22 NOTE — ED PROVIDER NOTES
Patient Seen in: [address] [facility]      History   Patient presents with:  Dizziness  Sweats    Stated Complaint: dizziness, lethargic    HPI    52-year-old male. Medical history of diabetes, thyroid disease and obesity.   Denies any signifi groomed, alert and aware x 3  Neck: Supple, full range of motion, no thyromegaly or lymphadenopathy. Eye examination: EOMs are intact, normal conjunctival  ENT: No injection noted to the bilateral auditory canals; no loss of landmarks.  Normal nasal mucosa

## 2020-08-23 LAB
ATRIAL RATE: 63 BPM
P AXIS: 28 DEGREES
P-R INTERVAL: 164 MS
Q-T INTERVAL: 418 MS
QRS DURATION: 96 MS
QTC CALCULATION (BEZET): 427 MS
R AXIS: -15 DEGREES
T AXIS: 26 DEGREES
VENTRICULAR RATE: 63 BPM

## 2020-08-24 ENCOUNTER — ORDER TRANSCRIPTION (OUTPATIENT)
Dept: ADMINISTRATIVE | Facility: HOSPITAL | Age: 55
End: 2020-08-24

## 2020-08-24 DIAGNOSIS — Z13.9 ENCOUNTER FOR SCREENING: Primary | ICD-10-CM

## 2020-09-02 ENCOUNTER — TELEPHONE (OUTPATIENT)
Dept: FAMILY MEDICINE CLINIC | Facility: CLINIC | Age: 55
End: 2020-09-02

## 2020-09-02 NOTE — TELEPHONE ENCOUNTER
Sister Called- wants to discuss pt since he is still blacking out. Sister is not on verbal release.     Sister was advised and verbalized understanding

## 2020-09-09 ENCOUNTER — TELEPHONE (OUTPATIENT)
Dept: FAMILY MEDICINE CLINIC | Facility: CLINIC | Age: 55
End: 2020-09-09

## 2020-09-09 NOTE — TELEPHONE ENCOUNTER
Spoke with pt wife- advised DS needs to see pt    Future Appointments   Date Time Provider Kya Navarro   9/10/2020  4:20 PM Emmy Adhikari [facility] MARGY Hyatt

## 2020-09-09 NOTE — TELEPHONE ENCOUNTER
KERVIN CALLED AND ADV THAT PT IS STILL FEELING DIZZY AT TIMES. WAS ADV THAT PT HAS GONE TO THE ER AND THEY FOUND NOTHING WRONG. USUALLY WHEN PT GETS UP THIS HAPPENS.     LOOKING FOR RECOMMENDATIONS    PLEASE ADV    THANK YOU

## 2020-09-10 ENCOUNTER — OFFICE VISIT (OUTPATIENT)
Dept: FAMILY MEDICINE CLINIC | Facility: CLINIC | Age: 55
End: 2020-09-10
Payer: COMMERCIAL

## 2020-09-10 VITALS
SYSTOLIC BLOOD PRESSURE: 124 MMHG | HEART RATE: 84 BPM | BODY MASS INDEX: 37 KG/M2 | TEMPERATURE: 98 F | DIASTOLIC BLOOD PRESSURE: 78 MMHG | WEIGHT: 256.38 LBS | RESPIRATION RATE: 20 BRPM

## 2020-09-10 DIAGNOSIS — E66.09 CLASS 2 OBESITY DUE TO EXCESS CALORIES WITHOUT SERIOUS COMORBIDITY WITH BODY MASS INDEX (BMI) OF 38.0 TO 38.9 IN ADULT: ICD-10-CM

## 2020-09-10 DIAGNOSIS — R55 SYNCOPE, UNSPECIFIED SYNCOPE TYPE: Primary | ICD-10-CM

## 2020-09-10 DIAGNOSIS — R42 LIGHTHEADEDNESS: ICD-10-CM

## 2020-09-10 PROCEDURE — 3078F DIAST BP <80 MM HG: CPT | Performed by: FAMILY MEDICINE

## 2020-09-10 PROCEDURE — 99214 OFFICE O/P EST MOD 30 MIN: CPT | Performed by: FAMILY MEDICINE

## 2020-09-10 PROCEDURE — 3074F SYST BP LT 130 MM HG: CPT | Performed by: FAMILY MEDICINE

## 2020-09-10 NOTE — PROGRESS NOTES
Madison Pringle is a 48year old male. HPI:   Rosa Alevism  Is here for discussion of lightheadedness he gets when he has gotten up tp go to the bathroom, he states it lasts about 15 minutes and once it passes he's fine.  He also gets sweaty and get some nausea lesions  HEENT: atraumatic, normocephalic,ears and throat are clear  NECK: supple,no adenopathy,no bruits  LUNGS: clear to auscultation  CARDIO: RRR without murmur  GI: good BS's,no masses, HSM or tenderness  EXTREMITIES: no cyanosis, clubbing or edema

## 2020-11-09 ENCOUNTER — TELEPHONE (OUTPATIENT)
Dept: FAMILY MEDICINE CLINIC | Facility: CLINIC | Age: 55
End: 2020-11-09

## 2020-11-09 DIAGNOSIS — Z00.00 ROUTINE HEALTH MAINTENANCE: Primary | ICD-10-CM

## 2020-11-09 NOTE — TELEPHONE ENCOUNTER
Wife scheduled appt for yearly px, she would like to have Dr. Soco Hays order labs beforehand so they can come in together. Please call wife back to schedule labs if appropriate.        Future Appointments   Date Time Provider Kya Navarro   11/23/2020  4:

## 2020-11-09 NOTE — TELEPHONE ENCOUNTER
[facility] notified. No Saturdays available per her request. Patient will get labs done at the time of OV.

## 2020-11-11 NOTE — PROGRESS NOTES
HPI:   Patient presents with:  Post-Op  Blood Pressure: high in hospital   Abnormal Labs  Weight Loss Gain      Patient Siena Guajardo is a 45year old male who presents for follow-up hospitalization patient with    Recent laparoscopy/gastric sleeve, at Patient presented today for lab draw. 1 mint, 1 lav tube(s) drawn from left ac area x1 with straight needle. Patient tolerated well. Pantoprazole Sodium 40 MG Oral Tab EC, Take 1 tablet (40 mg total) by mouth every morning before breakfast., Disp: 90 tablet, Rfl: 0    No current facility-administered medications on file prior to visit.       Past Medical History:   Diagnosis Date   • Juno tingling or weakness  PSYCH:  No mood concerns     EXAM:   /76   Pulse 83   Ht 69\"   Wt 222 lb 3.2 oz (100.8 kg)   SpO2 96%   BMI 32.81 kg/m²  Body mass index is 32.81 kg/m².   Wt Readings from Last 3 Encounters:  11/11/20 : 222 lb 3.2 oz (100.8 kg) with me pending report and in office in approximately 2 months  · The patient indicates understanding of these recommendtions and agrees to the above plan. Additional Assessment and Plan:  1.  Elevated blood pressure reading without diagnosis of hyperten

## 2020-11-23 ENCOUNTER — OFFICE VISIT (OUTPATIENT)
Dept: FAMILY MEDICINE CLINIC | Facility: CLINIC | Age: 55
End: 2020-11-23
Payer: COMMERCIAL

## 2020-11-23 VITALS
SYSTOLIC BLOOD PRESSURE: 140 MMHG | OXYGEN SATURATION: 100 % | HEART RATE: 98 BPM | WEIGHT: 265.63 LBS | TEMPERATURE: 99 F | DIASTOLIC BLOOD PRESSURE: 90 MMHG | BODY MASS INDEX: 38 KG/M2

## 2020-11-23 DIAGNOSIS — Z00.00 ROUTINE HEALTH MAINTENANCE: Primary | ICD-10-CM

## 2020-11-23 PROCEDURE — 3077F SYST BP >= 140 MM HG: CPT | Performed by: FAMILY MEDICINE

## 2020-11-23 PROCEDURE — 99072 ADDL SUPL MATRL&STAF TM PHE: CPT | Performed by: FAMILY MEDICINE

## 2020-11-23 PROCEDURE — 99396 PREV VISIT EST AGE 40-64: CPT | Performed by: FAMILY MEDICINE

## 2020-11-23 PROCEDURE — 3080F DIAST BP >= 90 MM HG: CPT | Performed by: FAMILY MEDICINE

## 2020-11-23 PROCEDURE — 3008F BODY MASS INDEX DOCD: CPT | Performed by: FAMILY MEDICINE

## 2020-11-23 NOTE — PROGRESS NOTES
Romana Adame is a 48year old male who presents for a complete physical exam.   HPI:   Pt complains of Nothing at this time he is due for labs,  He ate today, he got a flu shot,  And is due for a CPX, and also colon cancer screening, he was supposed t • Obesity       No past surgical history on file. No family history on file.    Social History:  Social History    Tobacco Use      Smoking status: Never Smoker      Smokeless tobacco: Never Used    Alcohol use: No    Drug use: No     Occ: works at [employer] kg/m²., recommended low fat diet and aerobic exercise 30 minutes three times weekly. Health maintenance, will check fasting Lipids, CMP, CBC and PSA. Pt referred for screening colonoscopy.  Pt info handouts given for: exercise, low fat diet, testicular self

## 2020-12-07 ENCOUNTER — NURSE ONLY (OUTPATIENT)
Dept: FAMILY MEDICINE CLINIC | Facility: CLINIC | Age: 55
End: 2020-12-07
Payer: COMMERCIAL

## 2020-12-07 DIAGNOSIS — Z00.00 ROUTINE HEALTH MAINTENANCE: ICD-10-CM

## 2020-12-07 PROCEDURE — 84153 ASSAY OF PSA TOTAL: CPT | Performed by: FAMILY MEDICINE

## 2020-12-07 PROCEDURE — 36415 COLL VENOUS BLD VENIPUNCTURE: CPT | Performed by: FAMILY MEDICINE

## 2020-12-07 PROCEDURE — 80050 GENERAL HEALTH PANEL: CPT | Performed by: FAMILY MEDICINE

## 2020-12-07 PROCEDURE — 80061 LIPID PANEL: CPT | Performed by: FAMILY MEDICINE

## 2020-12-07 NOTE — PROGRESS NOTES
Pt was in office for NV for labs ordered by DS    1 mint and 1 lavender tube collected from [facility] using straight needle and 1 attempt    Pt tolerated and was sent home in stable condition

## 2020-12-08 ENCOUNTER — TELEPHONE (OUTPATIENT)
Dept: FAMILY MEDICINE CLINIC | Facility: CLINIC | Age: 55
End: 2020-12-08

## 2020-12-08 RX ORDER — LEVOTHYROXINE SODIUM 0.15 MG/1
150 TABLET ORAL DAILY
Qty: 90 TABLET | Refills: 3 | Status: SHIPPED | OUTPATIENT
Start: 2020-12-08 | End: 2021-07-13

## 2020-12-08 NOTE — TELEPHONE ENCOUNTER
----- Message from Luisana Drake DO sent at 12/8/2020  8:26 AM CST -----  [facility]  labs looked very good, his  lipids were still a little elevated, but not terible  Excellent kidney, liver function, blood sugar and thyroid were all normal.

## 2021-01-19 ENCOUNTER — HOSPITAL ENCOUNTER (OUTPATIENT)
Age: 56
Discharge: HOME | End: 2021-01-19
Payer: COMMERCIAL

## 2021-01-19 VITALS
OXYGEN SATURATION: 95 % | DIASTOLIC BLOOD PRESSURE: 90 MMHG | RESPIRATION RATE: 14 BRPM | HEART RATE: 90 BPM | SYSTOLIC BLOOD PRESSURE: 144 MMHG | TEMPERATURE: 99 F

## 2021-01-19 DIAGNOSIS — H10.12 ACUTE ATOPIC CONJUNCTIVITIS OF LEFT EYE: Primary | ICD-10-CM

## 2021-01-19 PROCEDURE — 99213 OFFICE O/P EST LOW 20 MIN: CPT | Performed by: PHYSICIAN ASSISTANT

## 2021-01-19 RX ORDER — POLYMYXIN B SULFATE AND TRIMETHOPRIM 1; 10000 MG/ML; [USP'U]/ML
1 SOLUTION OPHTHALMIC
Qty: 10 ML | Refills: 0 | Status: SHIPPED | OUTPATIENT
Start: 2021-01-19 | End: 2021-01-24

## 2021-01-19 NOTE — ED PROVIDER NOTES
Patient Seen in: [facility] [address] [place] [address]      History   Patient presents with:   Eye Visual Problem    Stated Complaint: red eye    HPI/Subjective:   HPI    CHIEF COMPLAINT: Left eye redness, irritation    HISTORY OF PRESENT ILLNESS: Patient is a pleasant 5 (Temporal)   Resp 14   SpO2 95%         Physical Exam    General: Alert and oriented x4 in no acute distress. Visual Acuity: Recorded by the nurses and reviewed. Left eye: No periorbital edema, ecchymosis, erythema or induration present.  The lids (primary encounter diagnosis)    Disposition:  Discharge  1/19/2021  9:11 am    Follow-up:  Chidi Avery DO  [address] [facility],[address]  [facility] [id number]    Schedule an appointment as soon as possible for a visit in 2 days            Medicatio

## 2021-01-19 NOTE — ED INITIAL ASSESSMENT (HPI)
Pt states woke up yesterday with eye redness and soreness. Denies any visual changes.   Denies cold symptoms

## 2021-07-13 RX ORDER — LEVOTHYROXINE SODIUM 0.15 MG/1
150 TABLET ORAL DAILY
Qty: 90 TABLET | Refills: 3 | Status: SHIPPED | OUTPATIENT
Start: 2021-07-13

## 2021-07-13 NOTE — TELEPHONE ENCOUNTER
Wife states pt need refill on Thyroid medication    LOV: 11/23/20  Last Refill: #90 3 RF    No future appointments.

## 2021-10-11 NOTE — LETTER
4/3/2019        Kavita Dumont  [address] [facility] [address]      Dear Kavita Dumont.     To help us provide the highest quality medical care, [facility] uses a sophisticated computer system to track our patient record Pt went to urgent care Friday for abdominal pain. Pt came into ER due to increased pain.

## 2021-12-20 ENCOUNTER — TELEPHONE (OUTPATIENT)
Dept: FAMILY MEDICINE CLINIC | Facility: CLINIC | Age: 56
End: 2021-12-20

## 2021-12-20 NOTE — TELEPHONE ENCOUNTER
Pt called he has been off work since 12/3. His wife was positive for covid and then he went to get tested on 12/16. He came back positive on 12/19.  He is needing a note to return to work on 12/27

## 2022-04-11 ENCOUNTER — TELEPHONE (OUTPATIENT)
Dept: FAMILY MEDICINE CLINIC | Facility: CLINIC | Age: 57
End: 2022-04-11

## 2022-04-11 NOTE — TELEPHONE ENCOUNTER
Future Appointments   Date Time Provider Kya Navarro   4/13/2022  9:00 AM  [facility] [facility] Shayy Monsivais       Pt is coming in for NV- please place orders

## 2022-04-11 NOTE — TELEPHONE ENCOUNTER
Patient made nurse visit appt for Wednesday stating he is due to check thyroid    Patient was told by other PSR this morning that appt with  is also due.     Please place order    Thank you

## 2022-04-13 ENCOUNTER — NURSE ONLY (OUTPATIENT)
Dept: FAMILY MEDICINE CLINIC | Facility: CLINIC | Age: 57
End: 2022-04-13
Payer: COMMERCIAL

## 2022-04-13 DIAGNOSIS — Z00.00 ROUTINE HEALTH MAINTENANCE: ICD-10-CM

## 2022-04-13 LAB
ALBUMIN SERPL-MCNC: 3.8 G/DL (ref 3.4–5)
ALBUMIN/GLOB SERPL: 1.1 {RATIO} (ref 1–2)
ALP LIVER SERPL-CCNC: 98 U/L
ALT SERPL-CCNC: 41 U/L
ANION GAP SERPL CALC-SCNC: 5 MMOL/L (ref 0–18)
AST SERPL-CCNC: 31 U/L (ref 15–37)
BASOPHILS # BLD AUTO: 0.03 X10(3) UL (ref 0–0.2)
BASOPHILS NFR BLD AUTO: 0.4 %
BILIRUB SERPL-MCNC: 0.4 MG/DL (ref 0.1–2)
BUN BLD-MCNC: 17 MG/DL (ref 7–18)
CALCIUM BLD-MCNC: 9.1 MG/DL (ref 8.5–10.1)
CHLORIDE SERPL-SCNC: 107 MMOL/L (ref 98–112)
CHOLEST SERPL-MCNC: 193 MG/DL (ref ?–200)
CO2 SERPL-SCNC: 28 MMOL/L (ref 21–32)
COMPLEXED PSA SERPL-MCNC: 0.73 NG/ML (ref ?–4)
CREAT BLD-MCNC: 0.91 MG/DL
EOSINOPHIL # BLD AUTO: 0.28 X10(3) UL (ref 0–0.7)
EOSINOPHIL NFR BLD AUTO: 3.6 %
ERYTHROCYTE [DISTWIDTH] IN BLOOD BY AUTOMATED COUNT: 12.7 %
FASTING PATIENT LIPID ANSWER: YES
FASTING STATUS PATIENT QL REPORTED: YES
GLOBULIN PLAS-MCNC: 3.6 G/DL (ref 2.8–4.4)
GLUCOSE BLD-MCNC: 89 MG/DL (ref 70–99)
HCT VFR BLD AUTO: 49.3 %
HDLC SERPL-MCNC: 50 MG/DL (ref 40–59)
HGB BLD-MCNC: 16.6 G/DL
IMM GRANULOCYTES # BLD AUTO: 0.02 X10(3) UL (ref 0–1)
IMM GRANULOCYTES NFR BLD: 0.3 %
LDLC SERPL CALC-MCNC: 87 MG/DL (ref ?–100)
LYMPHOCYTES # BLD AUTO: 2.24 X10(3) UL (ref 1–4)
LYMPHOCYTES NFR BLD AUTO: 28.8 %
MCH RBC QN AUTO: 31.2 PG (ref 26–34)
MCHC RBC AUTO-ENTMCNC: 33.7 G/DL (ref 31–37)
MCV RBC AUTO: 92.7 FL
MONOCYTES # BLD AUTO: 0.57 X10(3) UL (ref 0.1–1)
MONOCYTES NFR BLD AUTO: 7.3 %
NEUTROPHILS # BLD AUTO: 4.63 X10 (3) UL (ref 1.5–7.7)
NEUTROPHILS # BLD AUTO: 4.63 X10(3) UL (ref 1.5–7.7)
NEUTROPHILS NFR BLD AUTO: 59.6 %
NONHDLC SERPL-MCNC: 143 MG/DL (ref ?–130)
OSMOLALITY SERPL CALC.SUM OF ELEC: 291 MOSM/KG (ref 275–295)
PLATELET # BLD AUTO: 238 10(3)UL (ref 150–450)
POTASSIUM SERPL-SCNC: 4.2 MMOL/L (ref 3.5–5.1)
PROT SERPL-MCNC: 7.4 G/DL (ref 6.4–8.2)
RBC # BLD AUTO: 5.32 X10(6)UL
SODIUM SERPL-SCNC: 140 MMOL/L (ref 136–145)
TRIGL SERPL-MCNC: 346 MG/DL (ref 30–149)
TSI SER-ACNC: 2.24 MIU/ML (ref 0.36–3.74)
VLDLC SERPL CALC-MCNC: 56 MG/DL (ref 0–30)
WBC # BLD AUTO: 7.8 X10(3) UL (ref 4–11)

## 2022-04-13 PROCEDURE — 84443 ASSAY THYROID STIM HORMONE: CPT | Performed by: FAMILY MEDICINE

## 2022-04-13 PROCEDURE — 85025 COMPLETE CBC W/AUTO DIFF WBC: CPT | Performed by: FAMILY MEDICINE

## 2022-04-13 PROCEDURE — 80053 COMPREHEN METABOLIC PANEL: CPT | Performed by: FAMILY MEDICINE

## 2022-04-13 PROCEDURE — 80061 LIPID PANEL: CPT | Performed by: FAMILY MEDICINE

## 2022-04-13 NOTE — PROGRESS NOTES
Pt was in office for labs per DS    1 gold and 1 lavender tube collected R AC using straight needle and 1 attempt    Pt tolerated and was sent home in stable condition

## 2022-04-14 ENCOUNTER — TELEPHONE (OUTPATIENT)
Dept: FAMILY MEDICINE CLINIC | Facility: CLINIC | Age: 57
End: 2022-04-14

## 2022-04-14 RX ORDER — LEVOTHYROXINE SODIUM 0.15 MG/1
150 TABLET ORAL DAILY
Qty: 90 TABLET | Refills: 3 | Status: SHIPPED | OUTPATIENT
Start: 2022-04-14

## 2022-04-14 NOTE — TELEPHONE ENCOUNTER
----- Message from Joe Min DO sent at 4/14/2022  8:22 AM CDT -----  Marcy Mednia , that overall his  labs looked very good, except for his TG being elevated, which is probably something he ate, the rest of his lipids were very good. His   kidney, liver function, blood sugar and thyroid were all normal. As was his Prostate. He is overdue for an OV, set that up with his convenience.

## 2022-04-14 NOTE — TELEPHONE ENCOUNTER
LOV: 11/23/20   Last Refill: 7/13/21  #90 3 RF    Future Appointments   Date Time Provider Kya Navarro   4/25/2022  4:00 PM [facility] MARGY Rogers

## 2022-04-14 NOTE — TELEPHONE ENCOUNTER
Pt returned call- pt was advised    Future Appointments   Date Time Provider Kya Navarro   4/25/2022  4:00 PM Nirmala Julio [facility] MARGY Choi

## 2022-04-14 NOTE — TELEPHONE ENCOUNTER
RN ADV PT OF TEST RESULTS, PT HAS SCHEDULED ANNUAL [facility] FOR 4/25.     PT WOULD LIKE REFILL OF MEDS:     Levothyroxine Sodium 150 MCG Oral Tab    PLEASE SEND  [facility]

## 2022-04-25 ENCOUNTER — OFFICE VISIT (OUTPATIENT)
Dept: FAMILY MEDICINE CLINIC | Facility: CLINIC | Age: 57
End: 2022-04-25
Payer: COMMERCIAL

## 2022-04-25 VITALS
HEIGHT: 69.5 IN | BODY MASS INDEX: 36.95 KG/M2 | RESPIRATION RATE: 24 BRPM | DIASTOLIC BLOOD PRESSURE: 84 MMHG | SYSTOLIC BLOOD PRESSURE: 132 MMHG | HEART RATE: 92 BPM | TEMPERATURE: 98 F | WEIGHT: 255.19 LBS

## 2022-04-25 DIAGNOSIS — Z00.00 ROUTINE HEALTH MAINTENANCE: Primary | ICD-10-CM

## 2022-04-25 PROCEDURE — 3008F BODY MASS INDEX DOCD: CPT | Performed by: FAMILY MEDICINE

## 2022-04-25 PROCEDURE — 99396 PREV VISIT EST AGE 40-64: CPT | Performed by: FAMILY MEDICINE

## 2022-04-25 PROCEDURE — 3079F DIAST BP 80-89 MM HG: CPT | Performed by: FAMILY MEDICINE

## 2022-04-25 PROCEDURE — 3075F SYST BP GE 130 - 139MM HG: CPT | Performed by: FAMILY MEDICINE

## 2022-04-25 RX ORDER — LEVOTHYROXINE SODIUM 0.15 MG/1
150 TABLET ORAL DAILY
Qty: 90 TABLET | Refills: 3 | Status: SHIPPED | OUTPATIENT
Start: 2022-04-25

## 2022-07-11 RX ORDER — LEVOTHYROXINE SODIUM 0.15 MG/1
TABLET ORAL
Qty: 90 TABLET | Refills: 3 | OUTPATIENT
Start: 2022-07-11

## 2022-07-11 NOTE — TELEPHONE ENCOUNTER
Thyroid Supplements Protocol Passed 07/10/2022 09:03 AM   Protocol Details  TSH test in past 12 months    TSH value between 0.350 and 5.500 IU/ml    Appointment in past 12 or next 3 months     LOV: 04/25/22   Last Refill: 04/25/22 90 3 RF    No future appointments.     Med refused to pharmacy pt should have refills on file

## 2022-09-26 ENCOUNTER — HOSPITAL ENCOUNTER (OUTPATIENT)
Age: 57
Discharge: HOME | End: 2022-09-26

## 2022-09-26 ENCOUNTER — TELEPHONE (OUTPATIENT)
Dept: FAMILY MEDICINE CLINIC | Facility: CLINIC | Age: 57
End: 2022-09-26

## 2022-09-26 VITALS
SYSTOLIC BLOOD PRESSURE: 152 MMHG | TEMPERATURE: 98 F | OXYGEN SATURATION: 97 % | DIASTOLIC BLOOD PRESSURE: 84 MMHG | HEART RATE: 97 BPM | RESPIRATION RATE: 18 BRPM

## 2022-09-26 DIAGNOSIS — M79.89 SWELLING OF BOTH HANDS: Primary | ICD-10-CM

## 2022-09-26 PROCEDURE — 99213 OFFICE O/P EST LOW 20 MIN: CPT | Performed by: NURSE PRACTITIONER

## 2022-09-26 RX ORDER — PREDNISONE 20 MG/1
40 TABLET ORAL DAILY
Qty: 10 TABLET | Refills: 0 | Status: SHIPPED | OUTPATIENT
Start: 2022-09-26 | End: 2022-10-01

## 2022-09-26 NOTE — TELEPHONE ENCOUNTER
Dr. Peng Rivas received paperwork for Donna Arita - Dr. Peng Rivas has not seen pt for this issue- pt needs to set up visit to come in and be evaluated.

## 2022-09-26 NOTE — ED INITIAL ASSESSMENT (HPI)
States has swelling to bilateral hands since yesterday. States had swelling to lips on Sat that has resolved. States has had intermittent swelling since 2007. Denies any throat swelling, denies lip swelling at this time.     States left hand swelling has improved since yesterday, and states rt hand swelling has improved slightly

## 2022-09-27 ENCOUNTER — OFFICE VISIT (OUTPATIENT)
Dept: FAMILY MEDICINE CLINIC | Facility: CLINIC | Age: 57
End: 2022-09-27

## 2022-09-27 VITALS
OXYGEN SATURATION: 96 % | BODY MASS INDEX: 37 KG/M2 | DIASTOLIC BLOOD PRESSURE: 74 MMHG | HEART RATE: 81 BPM | TEMPERATURE: 98 F | RESPIRATION RATE: 18 BRPM | SYSTOLIC BLOOD PRESSURE: 132 MMHG | WEIGHT: 254 LBS

## 2022-09-27 DIAGNOSIS — T78.3XXA ANGIOEDEMA, INITIAL ENCOUNTER: ICD-10-CM

## 2022-09-27 DIAGNOSIS — L50.9 URTICARIA: Primary | ICD-10-CM

## 2022-09-27 PROCEDURE — 99214 OFFICE O/P EST MOD 30 MIN: CPT | Performed by: FAMILY MEDICINE

## 2022-09-27 PROCEDURE — 3078F DIAST BP <80 MM HG: CPT | Performed by: FAMILY MEDICINE

## 2022-09-27 PROCEDURE — 3075F SYST BP GE 130 - 139MM HG: CPT | Performed by: FAMILY MEDICINE

## 2022-09-27 RX ORDER — MONTELUKAST SODIUM 10 MG/1
10 TABLET ORAL DAILY
Qty: 30 TABLET | Refills: 3 | Status: SHIPPED | OUTPATIENT
Start: 2022-09-27 | End: 2022-10-27

## 2022-09-30 PROBLEM — L50.9 URTICARIA: Status: ACTIVE | Noted: 2022-09-30

## 2022-09-30 PROBLEM — T78.3XXA ANGIOEDEMA: Status: ACTIVE | Noted: 2022-09-30

## 2022-10-04 ENCOUNTER — TELEPHONE (OUTPATIENT)
Dept: FAMILY MEDICINE CLINIC | Facility: CLINIC | Age: 57
End: 2022-10-04

## 2022-10-04 NOTE — TELEPHONE ENCOUNTER
RECEIVED MEDICAL RECORD REQUEST FROM THE [facility] REQUESTING ANY/ALL RECORDS FROM 9/26/22 TO PRESENT.      PRINTED AND FAXED OVER PAPERWORK     Dustin Huber

## 2022-10-29 ENCOUNTER — IMMUNIZATION (OUTPATIENT)
Dept: FAMILY MEDICINE CLINIC | Facility: CLINIC | Age: 57
End: 2022-10-29
Payer: COMMERCIAL

## 2022-10-29 DIAGNOSIS — T78.3XXA ANGIOEDEMA, INITIAL ENCOUNTER: ICD-10-CM

## 2022-10-29 DIAGNOSIS — Z23 NEED FOR VACCINATION: Primary | ICD-10-CM

## 2022-10-29 DIAGNOSIS — L50.9 URTICARIA: ICD-10-CM

## 2022-10-29 PROCEDURE — 90686 IIV4 VACC NO PRSV 0.5 ML IM: CPT | Performed by: FAMILY MEDICINE

## 2022-10-29 PROCEDURE — 90471 IMMUNIZATION ADMIN: CPT | Performed by: FAMILY MEDICINE

## 2022-11-01 ENCOUNTER — MED REC SCAN ONLY (OUTPATIENT)
Dept: FAMILY MEDICINE CLINIC | Facility: CLINIC | Age: 57
End: 2022-11-01

## 2022-12-01 ENCOUNTER — HOSPITAL ENCOUNTER (OUTPATIENT)
Age: 57
Discharge: HOME | End: 2022-12-01
Payer: COMMERCIAL

## 2022-12-01 VITALS
BODY MASS INDEX: 37.03 KG/M2 | HEIGHT: 69 IN | TEMPERATURE: 98 F | HEART RATE: 78 BPM | DIASTOLIC BLOOD PRESSURE: 86 MMHG | SYSTOLIC BLOOD PRESSURE: 152 MMHG | WEIGHT: 250 LBS | OXYGEN SATURATION: 97 % | RESPIRATION RATE: 18 BRPM

## 2022-12-01 DIAGNOSIS — J06.9 UPPER RESPIRATORY TRACT INFECTION, UNSPECIFIED TYPE: Primary | ICD-10-CM

## 2022-12-01 LAB
POCT INFLUENZA A: NEGATIVE
POCT INFLUENZA B: NEGATIVE
S PYO AG THROAT QL: NEGATIVE

## 2022-12-01 PROCEDURE — 87502 INFLUENZA DNA AMP PROBE: CPT | Performed by: NURSE PRACTITIONER

## 2022-12-01 PROCEDURE — 87880 STREP A ASSAY W/OPTIC: CPT | Performed by: NURSE PRACTITIONER

## 2022-12-01 PROCEDURE — 99213 OFFICE O/P EST LOW 20 MIN: CPT | Performed by: NURSE PRACTITIONER

## 2022-12-05 ENCOUNTER — OFFICE VISIT (OUTPATIENT)
Dept: FAMILY MEDICINE CLINIC | Facility: CLINIC | Age: 57
End: 2022-12-05
Payer: COMMERCIAL

## 2022-12-05 ENCOUNTER — TELEPHONE (OUTPATIENT)
Dept: FAMILY MEDICINE CLINIC | Facility: CLINIC | Age: 57
End: 2022-12-05

## 2022-12-05 VITALS
SYSTOLIC BLOOD PRESSURE: 138 MMHG | TEMPERATURE: 98 F | BODY MASS INDEX: 39 KG/M2 | HEART RATE: 80 BPM | RESPIRATION RATE: 16 BRPM | OXYGEN SATURATION: 98 % | DIASTOLIC BLOOD PRESSURE: 84 MMHG | WEIGHT: 262.81 LBS

## 2022-12-05 DIAGNOSIS — J06.9 VIRAL URI: ICD-10-CM

## 2022-12-05 DIAGNOSIS — R06.00 DYSPNEA, UNSPECIFIED TYPE: ICD-10-CM

## 2022-12-05 DIAGNOSIS — R05.1 ACUTE COUGH: Primary | ICD-10-CM

## 2022-12-05 DIAGNOSIS — J98.01 BRONCHOSPASM: ICD-10-CM

## 2022-12-05 PROCEDURE — 3075F SYST BP GE 130 - 139MM HG: CPT | Performed by: FAMILY MEDICINE

## 2022-12-05 PROCEDURE — 3079F DIAST BP 80-89 MM HG: CPT | Performed by: FAMILY MEDICINE

## 2022-12-05 PROCEDURE — 99214 OFFICE O/P EST MOD 30 MIN: CPT | Performed by: FAMILY MEDICINE

## 2022-12-05 RX ORDER — MONTELUKAST SODIUM 10 MG/1
10 TABLET ORAL NIGHTLY
Qty: 90 TABLET | Refills: 2 | Status: SHIPPED | OUTPATIENT
Start: 2022-12-05 | End: 2023-03-05

## 2022-12-05 RX ORDER — PREDNISONE 10 MG/1
TABLET ORAL
Qty: 18 TABLET | Refills: 0 | Status: SHIPPED | OUTPATIENT
Start: 2022-12-05

## 2022-12-05 RX ORDER — HYDROCODONE BITARTRATE AND HOMATROPINE METHYLBROMIDE ORAL SOLUTION 5; 1.5 MG/5ML; MG/5ML
5 LIQUID ORAL EVERY 6 HOURS PRN
Qty: 100 ML | Refills: 0 | Status: SHIPPED | OUTPATIENT
Start: 2022-12-05 | End: 2022-12-12

## 2022-12-05 NOTE — TELEPHONE ENCOUNTER
SPOUSE CALLED AND ADV THAT PT HAS BEEN SICK WITH REALLY BAD COUGH, DOESN'T FEEL WELL AT ALL. WENT TO US ON Thursday AND NOTHING WAS PRESCRIBED.     LOOKING TO BE SEEN OR SOMETHING TO BE CALLED IN    [facility]    ** ALSO WANTS TO A NOTE TO BE EXCUSED FROM WORK FOR A COUPLE OF DAYS **      PLEASE ADV    THANK YOU

## 2022-12-27 ENCOUNTER — TELEPHONE (OUTPATIENT)
Dept: FAMILY MEDICINE CLINIC | Facility: CLINIC | Age: 57
End: 2022-12-27

## 2022-12-27 NOTE — TELEPHONE ENCOUNTER
PATIENT SPOUSE CALLING. SHE SAYS THAT PATIENT BOTTLE OF  MONTELUKAST SHOWS NO REFILLS. WE ARE SHOWING 2 REFILLS LEFT.  SPOUSE WANTS TO CONFIRM

## 2023-06-19 RX ORDER — LEVOTHYROXINE SODIUM 0.15 MG/1
150 TABLET ORAL DAILY
Qty: 90 TABLET | Refills: 3 | Status: SHIPPED | OUTPATIENT
Start: 2023-06-19

## 2023-06-19 NOTE — TELEPHONE ENCOUNTER
Thyroid Supplements Protocol Failed 06/19/2023 09:21 AM   Protocol Details  TSH test in past 12 months    TSH value between 0.350 and 5.500 IU/ml    Appointment in past 12 or next 3 months        Routing to provider per protocol. levothyroxine 150 MCG Oral Tab  Last refilled on 4/14/22 for #90  with 3 rf. Last labs 4/13/22. Last seen on 12/5/22. No future appointments. Thank you.

## 2023-06-19 NOTE — TELEPHONE ENCOUNTER
Lita Aguilera requesting Medication Refill for:  levothyroxine 150 MCG Oral Tab    LOV: 12/5/2022   Last Refill date: 4/25/22  Pharmacy: [facility] wife said the medication should be for the thyroid and was unsure of the name, just [place]

## 2023-08-31 RX ORDER — MONTELUKAST SODIUM 10 MG/1
10 TABLET ORAL NIGHTLY
Qty: 90 TABLET | Refills: 2 | Status: SHIPPED | OUTPATIENT
Start: 2023-08-31

## 2023-08-31 RX ORDER — LEVOTHYROXINE SODIUM 0.15 MG/1
150 TABLET ORAL DAILY
Qty: 90 TABLET | Refills: 3 | Status: SHIPPED | OUTPATIENT
Start: 2023-08-31

## 2023-10-10 RX ORDER — MONTELUKAST SODIUM 10 MG/1
10 TABLET ORAL NIGHTLY
Qty: 90 TABLET | Refills: 2 | OUTPATIENT
Start: 2023-10-10

## 2023-10-16 ENCOUNTER — IMMUNIZATION (OUTPATIENT)
Dept: FAMILY MEDICINE CLINIC | Facility: CLINIC | Age: 58
End: 2023-10-16
Payer: MEDICAID

## 2023-10-16 DIAGNOSIS — Z23 NEED FOR VACCINATION: Primary | ICD-10-CM

## 2023-10-16 PROCEDURE — 90686 IIV4 VACC NO PRSV 0.5 ML IM: CPT | Performed by: FAMILY MEDICINE

## 2023-10-16 PROCEDURE — 90471 IMMUNIZATION ADMIN: CPT | Performed by: FAMILY MEDICINE

## 2024-01-02 ENCOUNTER — TELEPHONE (OUTPATIENT)
Dept: FAMILY MEDICINE CLINIC | Facility: CLINIC | Age: 59
End: 2024-01-02

## 2024-01-02 RX ORDER — LISINOPRIL AND HYDROCHLOROTHIAZIDE 12.5; 1 MG/1; MG/1
1 TABLET ORAL DAILY
Qty: 30 TABLET | Refills: 3 | Status: SHIPPED | OUTPATIENT
Start: 2024-01-02

## 2024-01-02 NOTE — TELEPHONE ENCOUNTER
Patient notified and verbalized understanding.   Aware medication sent to [facility]  F/rosalinda scheduled    Future Appointments   Date Time Provider Department Center   1/8/2024  2:00 PM Felipe Delgado DO [facility] [place]

## 2024-01-02 NOTE — TELEPHONE ENCOUNTER
Theres no question he needs medication. I can send some thing in for him to start.and then set him up with and appt in a week. Take it in the AM, cut back on salt intake.

## 2024-01-02 NOTE — TELEPHONE ENCOUNTER
Pt states he was at ER this morning for dizziness and high blood pressure - over 200.  He was told to follow-up with PCP.  All numbers were fine when he left.  States he still feels strange and he thinks he needs medication.  Would like an appt today.  Please advise. Thank you!

## 2024-01-11 ENCOUNTER — MED REC SCAN ONLY (OUTPATIENT)
Dept: FAMILY MEDICINE CLINIC | Facility: CLINIC | Age: 59
End: 2024-01-11

## 2024-03-25 RX ORDER — MONTELUKAST SODIUM 10 MG/1
10 TABLET ORAL NIGHTLY
Qty: 90 TABLET | Refills: 2 | Status: SHIPPED | OUTPATIENT
Start: 2024-03-25

## 2024-03-25 NOTE — TELEPHONE ENCOUNTER
PT CALLED AND ADV THAT HE NEEDS REFILLS OF     montelukast 10 MG Oral Tab     PLEASE SEND TO [facility]     THANK YOU

## 2024-09-13 RX ORDER — LEVOTHYROXINE SODIUM 150 UG/1
150 TABLET ORAL DAILY
Qty: 90 TABLET | Refills: 3 | Status: SHIPPED | OUTPATIENT
Start: 2024-09-13

## 2024-09-13 NOTE — TELEPHONE ENCOUNTER
Thyroid Medication Protocol [id number]/13/2024 09:31 AM   Protocol Details TSH in past 12 months    In person appointment or virtual visit in the past 12 mos or appointment in next 3 mos    Last TSH value is normal      Routing to provider per protocol.   levothyroxine 150 MCG Oral Tab   Last refilled on 8/31/23 for #90  with 3 rf.   Last labs 1/2/24.   Last seen on 12/5/22.     No future appointments.       Thank you.

## 2024-09-13 NOTE — TELEPHONE ENCOUNTER
Patient's spouse Caitlin calling to request refill of levothyroxine 150 MCG Oral Tab   Pharmacy [id number] - [place], [place] - [address] AT [facility] [address]), [phone], [phone] [[id number]]

## 2024-09-20 ENCOUNTER — TELEPHONE (OUTPATIENT)
Dept: FAMILY MEDICINE CLINIC | Facility: CLINIC | Age: 59
End: 2024-09-20

## 2024-09-20 RX ORDER — LEVOTHYROXINE SODIUM 150 UG/1
150 TABLET ORAL DAILY
Qty: 90 TABLET | Refills: 3 | Status: SHIPPED | OUTPATIENT
Start: 2024-09-20

## 2024-09-20 RX ORDER — LEVOTHYROXINE SODIUM 150 UG/1
150 TABLET ORAL DAILY
Qty: 90 TABLET | Refills: 3 | OUTPATIENT
Start: 2024-09-20

## 2024-09-20 NOTE — TELEPHONE ENCOUNTER
[id number] - [place], [place] - [address] AT [facility] [address] [facility] [address] ([address]), [phone], [phone]   [address] [facility] [facility] [address]   Phone: [phone] Fax: [phone]       PATIENT CALLING THIS MORNING. HE SAYS [employer] [employer] IS TELLING HIM THAT HE NEEDS TO REACH OUT TO HIS PCP FOR REFILL ON           levothyroxine 150 MCG Oral Tab 90 tablet 3 9/13/2024 --    Sig - Route: Take 1 tablet (150 mcg total) by mouth daily. - Oral    Sent to pharmacy as: Levothyroxine Sodium 150 MCG Oral Tablet (Synthroid)    E-Prescribing Status: Receipt confirmed by pharmacy (9/13/2024  6:45 PM CDT)      I INFORMED PATIENT REFILL IS IN BUT INFORMED PATIENT I WILL SEND BACK TO NURSE.

## 2024-09-20 NOTE — TELEPHONE ENCOUNTER
Resent prescription sent on 9/13/2024. Patient has been notified, verbalized understanding of information. Denies further questions.

## 2024-09-20 NOTE — TELEPHONE ENCOUNTER
Thyroid Medication Protocol [id number]/20/2024 09:27 AM   Protocol Details TSH in past 12 months    In person appointment or virtual visit in the past 12 mos or appointment in next 3 mos    Last TSH value is normal   Routing to provider per protocol.   levothyroxine 150 MCG Oral Tab   Last refilled on 9/13/24 for #90  with 3 rf.   Last labs 1/2/24.   Last seen on 12/5/22.     No future appointments.       Thank you.     DUPLICATE.

## 2024-12-19 ENCOUNTER — TELEPHONE (OUTPATIENT)
Dept: FAMILY MEDICINE CLINIC | Facility: CLINIC | Age: 59
End: 2024-12-19

## 2024-12-19 RX ORDER — MONTELUKAST SODIUM 10 MG/1
10 TABLET ORAL NIGHTLY
Qty: 90 TABLET | Refills: 2 | Status: SHIPPED | OUTPATIENT
Start: 2024-12-19

## 2024-12-19 NOTE — TELEPHONE ENCOUNTER
Pt needs the following refilled:    montelukast 10 MG Oral Tab [[id number]] (Order [id number])     Please send to:  [id number] - [place], [place] - [address] AT [facility] [address] [phone], [phone]   [address] [address]   Phone: [phone] Fax: [phone]     Please call [phone] if any questions about refill.  Thank you!

## 2025-05-08 ENCOUNTER — TELEPHONE (OUTPATIENT)
Dept: FAMILY MEDICINE CLINIC | Facility: CLINIC | Age: 60
End: 2025-05-08

## 2025-05-08 RX ORDER — LEVOTHYROXINE SODIUM 150 UG/1
150 TABLET ORAL DAILY
Qty: 30 TABLET | Refills: 0 | Status: SHIPPED | OUTPATIENT
Start: 2025-05-08

## 2025-05-08 RX ORDER — MONTELUKAST SODIUM 10 MG/1
10 TABLET ORAL NIGHTLY
Qty: 90 TABLET | Refills: 2 | Status: SHIPPED | OUTPATIENT
Start: 2025-05-08

## 2025-05-08 NOTE — TELEPHONE ENCOUNTER
[id number] [facility] - [place], [place] - [address] [phone], [phone]   [address] [facility] [address]   Phone: [phone] Fax: [phone]   Hours: Not open 24 hours       PATIENT REQUESTING REFILL FOR     montelukast 10 MG Oral Tab 90 tablet 2 12/19/2024 --    Sig - Route: Take 1 tablet (10 mg total) by mouth nightly. - Oral    Sent to pharmacy as: Montelukast Sodium 10 MG Oral Tablet (Singulair)          levothyroxine 150 MCG Oral Tab 90 tablet 3 9/20/2024 --   Sig:   Take 1 tablet (150 mcg total) by mouth daily.     Route:   Oral         PATIENT CHANGED PHARMACY TO [facility]-

## 2025-05-08 NOTE — TELEPHONE ENCOUNTER
LOV 12/5/22  LRF   Montelukast  12/19/24 #90 with 2- [facility]  Levothyroxine 9/20/24 #90 with 3- [facility]    Called the pt and asked if he is still a patient of Dr. Delgado and he states that he hasn't been sick so he has not come to the office. Pt advised that he needs an appointment. scheduled an appt for CPX    Future Appointments   Date Time Provider Department Center   6/3/2025  9:00 AM Felipe Delgado DO [facility] [place]     Pt needs meds to [facility] [place]  Will send 30 days to bridge to appointment

## 2025-06-03 ENCOUNTER — OFFICE VISIT (OUTPATIENT)
Dept: FAMILY MEDICINE CLINIC | Facility: CLINIC | Age: 60
End: 2025-06-03
Payer: COMMERCIAL

## 2025-06-03 VITALS
WEIGHT: 260.38 LBS | HEIGHT: 67.5 IN | HEART RATE: 88 BPM | RESPIRATION RATE: 16 BRPM | OXYGEN SATURATION: 96 % | TEMPERATURE: 97 F | DIASTOLIC BLOOD PRESSURE: 102 MMHG | BODY MASS INDEX: 40.39 KG/M2 | SYSTOLIC BLOOD PRESSURE: 142 MMHG

## 2025-06-03 DIAGNOSIS — Z00.00 ROUTINE HEALTH MAINTENANCE: Primary | ICD-10-CM

## 2025-06-03 DIAGNOSIS — K42.9 UMBILICAL HERNIA WITHOUT OBSTRUCTION AND WITHOUT GANGRENE: ICD-10-CM

## 2025-06-03 LAB
ALBUMIN SERPL-MCNC: 4.7 G/DL (ref 3.2–4.8)
ALBUMIN/GLOB SERPL: 1.5 {RATIO} (ref 1–2)
ALP LIVER SERPL-CCNC: 110 U/L (ref 45–117)
ALT SERPL-CCNC: 61 U/L (ref 10–49)
ANION GAP SERPL CALC-SCNC: 11 MMOL/L (ref 0–18)
AST SERPL-CCNC: 51 U/L (ref ?–34)
BASOPHILS # BLD AUTO: 0.03 X10(3) UL (ref 0–0.2)
BASOPHILS NFR BLD AUTO: 0.5 %
BILIRUB SERPL-MCNC: 1 MG/DL (ref 0.3–1.2)
BUN BLD-MCNC: 12 MG/DL (ref 9–23)
CALCIUM BLD-MCNC: 9.8 MG/DL (ref 8.7–10.6)
CHLORIDE SERPL-SCNC: 104 MMOL/L (ref 98–112)
CHOLEST SERPL-MCNC: 198 MG/DL (ref ?–200)
CO2 SERPL-SCNC: 27 MMOL/L (ref 21–32)
COMPLEXED PSA SERPL-MCNC: 0.83 NG/ML (ref ?–4)
CREAT BLD-MCNC: 0.92 MG/DL (ref 0.7–1.3)
EGFRCR SERPLBLD CKD-EPI 2021: 96 ML/MIN/1.73M2 (ref 60–?)
EOSINOPHIL # BLD AUTO: 0.17 X10(3) UL (ref 0–0.7)
EOSINOPHIL NFR BLD AUTO: 2.7 %
ERYTHROCYTE [DISTWIDTH] IN BLOOD BY AUTOMATED COUNT: 12.6 %
FASTING PATIENT LIPID ANSWER: YES
FASTING STATUS PATIENT QL REPORTED: YES
GLOBULIN PLAS-MCNC: 3.2 G/DL (ref 2–3.5)
GLUCOSE BLD-MCNC: 92 MG/DL (ref 70–99)
HCT VFR BLD AUTO: 51.2 % (ref 39–53)
HDLC SERPL-MCNC: 61 MG/DL (ref 40–59)
HGB BLD-MCNC: 17.2 G/DL (ref 13–17.5)
IMM GRANULOCYTES # BLD AUTO: 0.01 X10(3) UL (ref 0–1)
IMM GRANULOCYTES NFR BLD: 0.2 %
LDLC SERPL CALC-MCNC: 108 MG/DL (ref ?–100)
LYMPHOCYTES # BLD AUTO: 1.66 X10(3) UL (ref 1–4)
LYMPHOCYTES NFR BLD AUTO: 26.1 %
MCH RBC QN AUTO: 30.2 PG (ref 26–34)
MCHC RBC AUTO-ENTMCNC: 33.6 G/DL (ref 31–37)
MCV RBC AUTO: 89.8 FL (ref 80–100)
MONOCYTES # BLD AUTO: 0.5 X10(3) UL (ref 0.1–1)
MONOCYTES NFR BLD AUTO: 7.9 %
NEUTROPHILS # BLD AUTO: 3.98 X10 (3) UL (ref 1.5–7.7)
NEUTROPHILS # BLD AUTO: 3.98 X10(3) UL (ref 1.5–7.7)
NEUTROPHILS NFR BLD AUTO: 62.6 %
NONHDLC SERPL-MCNC: 137 MG/DL (ref ?–130)
OSMOLALITY SERPL CALC.SUM OF ELEC: 293 MOSM/KG (ref 275–295)
PLATELET # BLD AUTO: 207 10(3)UL (ref 150–450)
POTASSIUM SERPL-SCNC: 4 MMOL/L (ref 3.5–5.1)
PROT SERPL-MCNC: 7.9 G/DL (ref 5.7–8.2)
RBC # BLD AUTO: 5.7 X10(6)UL (ref 4.3–5.7)
SODIUM SERPL-SCNC: 142 MMOL/L (ref 136–145)
T4 FREE SERPL-MCNC: 1.3 NG/DL (ref 0.8–1.7)
TRIGL SERPL-MCNC: 165 MG/DL (ref 30–149)
TSI SER-ACNC: 1.99 UIU/ML (ref 0.55–4.78)
VLDLC SERPL CALC-MCNC: 28 MG/DL (ref 0–30)
WBC # BLD AUTO: 6.4 X10(3) UL (ref 4–11)

## 2025-06-03 PROCEDURE — 84443 ASSAY THYROID STIM HORMONE: CPT | Performed by: FAMILY MEDICINE

## 2025-06-03 PROCEDURE — 80053 COMPREHEN METABOLIC PANEL: CPT | Performed by: FAMILY MEDICINE

## 2025-06-03 PROCEDURE — 84439 ASSAY OF FREE THYROXINE: CPT | Performed by: FAMILY MEDICINE

## 2025-06-03 PROCEDURE — 99396 PREV VISIT EST AGE 40-64: CPT | Performed by: FAMILY MEDICINE

## 2025-06-03 PROCEDURE — 80061 LIPID PANEL: CPT | Performed by: FAMILY MEDICINE

## 2025-06-03 PROCEDURE — 85025 COMPLETE CBC W/AUTO DIFF WBC: CPT | Performed by: FAMILY MEDICINE

## 2025-06-03 NOTE — PROGRESS NOTES
Alessandro Jackson is a 60 year old male who presents for a complete physical exam.   HPI:   Pt complains of is here for their yearly physical, has not been hospitalized the past year. no recent surgery, no new RX meds and no new allergies. He never took the prescribed blood pressure medicine because he didn't; think he needed it. Needs colon cancer screening, and also needs labs. He is not sure why he's here, was due for refills, had not been seen for 3 years. Has gained 10 more pounds. He says they walk miles, walk [place] takes them an hour, roughly 1 mile, eat healthy, but not sure how much he eats . Questioning need for OV when \" everything is OK\",    .    Immunization History   Administered Date(s) Administered    Covid-19 Vaccine Moderna 100 mcg/0.5 ml 04/14/2021, 05/14/2021    FLULAVAL 6 months & older 0.5 ml Prefilled syringe (08042) 10/18/2018, 10/04/2019, 10/29/2022    FLUZONE 6 months and older PFS 0.5 ml (28019) 10/18/2018, 10/04/2019, 11/18/2021, 10/16/2023    Influenza 09/24/2024    TDAP 08/07/2018     Wt Readings from Last 6 Encounters:   06/03/25 260 lb 6 oz (118.1 kg)   12/05/22 262 lb 12.8 oz (119.2 kg)   12/01/22 250 lb (113.4 kg)   09/27/22 254 lb (115.2 kg)   04/25/22 255 lb 3.2 oz (115.8 kg)   11/23/20 265 lb 9.6 oz (120.5 kg)     Body mass index is 40.18 kg/m².     Lab Results   Component Value Date    GLU 89 04/13/2022    GLU 78 12/07/2020    GLU 91 03/23/2019     Lab Results   Component Value Date    CHOLEST 193 04/13/2022    CHOLEST 207 (H) 12/07/2020    CHOLEST 204 (H) 03/23/2019     Lab Results   Component Value Date    HDL 50 04/13/2022    HDL 64 (H) 12/07/2020    HDL 50 03/23/2019     Lab Results   Component Value Date    LDL 87 04/13/2022     (H) 12/07/2020     (H) 03/23/2019     Lab Results   Component Value Date    AST 31 04/13/2022    AST 30 12/07/2020    AST 44 (H) 03/23/2019     Lab Results   Component Value Date    ALT 41 04/13/2022    ALT 47  12/07/2020    ALT 77 (H) 03/23/2019     No results found for: \"PSA\"     Current Outpatient Medications   Medication Sig Dispense Refill    levothyroxine 150 MCG Oral Tab Take 1 tablet (150 mcg total) by mouth daily. 30 tablet 0    montelukast 10 MG Oral Tab Take 1 tablet (10 mg total) by mouth nightly. 90 tablet 2    MAGNESIUM OR Take by mouth.      cetirizine 10 MG Oral Tab Take 10 mg by mouth daily.      lisinopril-hydroCHLOROthiazide 10-12.5 MG Oral Tab Take 1 tablet by mouth daily. (Patient not taking: Reported on 6/3/2025) 30 tablet 3      Past Medical History:    Hypothyroidism    Hypothyroidism    Obesity      Past Surgical History:   Procedure Laterality Date    Removal gallbladder        No family history on file.   Social History:  Social History     Socioeconomic History    Marital status:    Tobacco Use    Smoking status: Never    Smokeless tobacco: Never   Vaping Use    Vaping status: Never Used   Substance and Sexual Activity    Alcohol use: No    Drug use: No   Other Topics Concern    Caffeine Concern No    Exercise No    Seat Belt Yes    Special Diet No    Stress Concern No    Weight Concern Yes     Social Drivers of Health      Received from [facility]    Housing Stability      Occ: factiry. : yes. Children:  .   Exercise: none.  Diet: doesn't watch     REVIEW OF SYSTEMS:   GENERAL: feels well otherwise  SKIN: denies any unusual skin lesions  EYES:denies blurred vision or double vision  HEENT: denies nasal congestion, sinus pain or ST  LUNGS: denies shortness of breath with exertion  CARDIOVASCULAR: denies chest pain on exertion, elevated BP, not taking BP meds  GI: denies abdominal pain,denies heartburn  : denies nocturia or changes in stream  MUSCULOSKELETAL: denies back pain  NEURO: denies headaches  PSYCHE: denies depression or anxiety  HEMATOLOGIC: denies hx of anemia  ENDOCRINE: denies thyroid history, has gained 10 pounds  ALL/ASTHMA: denies hx of allergy  or asthma    EXAM:   BP (!) 142/102   Pulse 88   Temp 97.4 °F (36.3 °C) (Temporal)   Resp 16   Ht 5' 7.5\" (1.715 m)   Wt 260 lb 6 oz (118.1 kg)   SpO2 96%   BMI 40.18 kg/m²   Body mass index is 40.18 kg/m².   GENERAL: well developed, well nourished,in no apparent distress  SKIN: no rashes,no suspicious lesions  HEENT: atraumatic, normocephalic,ears and throat are clear  EYES:PERRLA, EOMI, normal optic disk,conjunctiva are clear  NECK: supple,no adenopathy,no bruits  CHEST: no chest tenderness  LUNGS: clear to auscultation  CARDIO: RRR without murmur  GI: good BS's,no masses, HSM or tenderness  : two descended testes,no masses, has a large umbilical hernia,no penile lesions  MUSCULOSKELETAL: back is not tender,FROM of the back  EXTREMITIES: no cyanosis, clubbing or edema  NEURO: Oriented times three,cranial nerves are intact,motor and sensory are grossly intact    ASSESSMENT AND PLAN:   Alessandro Jackson is a 60 year old male who presents for a complete physical exam.  Pt's weight is Body mass index is 40.18 kg/m²., recommended low fat diet and aerobic exercise 30 minutes three times weekly. Health maintenance, will check fasting Lipids, CMP, CBC and PSA. Pt referred for screening colonoscopy.  Pt info handouts given for: exercise, low fat diet, testicular self exam and prostate cancer screening. The patient indicates understanding of these issues and agrees to the plan.  The patient is asked to return for CPX in 1 year.  Encounter Diagnoses   Name Primary?    Routine health maintenance Yes    Umbilical hernia without obstruction and without gangrene        Orders Placed This Encounter   Procedures    CBC W Differential W Platelet [E]    Comp Metabolic Panel (14) [E]    Lipid Panel [E]    TSH and Free T4 [E]    PSA, Total (Screening) [E]    *Venipuncture     INSTRUCTED TO TRY AND GET 5 SERVINGS OF FRUITS AND VEGETABLES DAILY, tyr and walk at least at  4 mph pace  8  oz of water daily  3 SERVINGS OF LOW FAT  DAIRY DAILY, increase protein intake  AND 60 MINUTES OF PHYSICAL ACTIVITY A DAY  AWAIT RESPONSE FOR VALE

## 2025-06-06 RX ORDER — LEVOTHYROXINE SODIUM 150 UG/1
150 TABLET ORAL DAILY
Qty: 90 TABLET | Refills: 2 | Status: SHIPPED | OUTPATIENT
Start: 2025-06-06

## 2025-06-06 RX ORDER — MONTELUKAST SODIUM 10 MG/1
10 TABLET ORAL NIGHTLY
Qty: 90 TABLET | Refills: 2 | Status: SHIPPED | OUTPATIENT
Start: 2025-06-06

## 2025-06-06 NOTE — TELEPHONE ENCOUNTER
Patient's name and  verified   Patient doesn't drink or use Tylenol.     Patient needs refill his Montelukast and levothyroxine.     Patient notified and verbalized an understanding

## (undated) DIAGNOSIS — Z00.00 ROUTINE HEALTH MAINTENANCE: Primary | ICD-10-CM

## (undated) NOTE — LETTER
12/20/21    Gabino Smisoy      To Whom It May Concern: This letter has been written at the patient's request. The above patient tested positive for COVID on 12/19/21. The patient may return to work on 12/30/21 with the following limitations none.

## (undated) NOTE — Clinical Note
02/01/2017  Devika Zaragoza  1/24/1965    To whom it may concern,    Mr. Philip Huynh was seen in my office today for complaints of lower back pain.   I would recommend he be placed on light duty consisting of no lifting over 10-15 pounds, no repetitive

## (undated) NOTE — LETTER
Date: 9/10/2020    Patient Name: Romana Adame          To Whom it may concern: This letter has been written at the patient's request. The above patient was seen at the [facility] for treatment of a medical condition.     This patient s

## (undated) NOTE — LETTER
04/22/19        Aureliano Canales  [address] [address]      Dear Dennie Minerva records indicate that you have outstanding lab work and or testing that was ordered for you and has not yet been completed:  Lab Frequency Next Occurrence

## (undated) NOTE — Clinical Note
Pt asks for his levothyroxine to be filled to [address] also asks if he can do his blood work once a year for his thyroid.  I asked if his med dose has been changed in the last year and he tells me neverI advised that it is usually doctor discre

## (undated) NOTE — LETTER
Date & Time: 9/26/2022, 10:45 AM  Patient: Thien Powers  Encounter Provider(s):    LILIA Fitzgerald       To Whom It May Concern:    Nevaeh Henry was seen and treated in our department on 9/26/2022. Please excuse Liam Betancourt from work today, was here with her  in the immediate care.     If you have any questions or concerns, please do not hesitate to call.        _____________________________  Physician/APC Signature

## (undated) NOTE — Clinical Note
Date: 3/6/2017    Patient Name: Reba Salinas          To Whom it may concern: This letter has been written at the patient's request. The above patient was seen at the [facility] for treatment of a medical condition.       The patient m

## (undated) NOTE — LETTER
10/12/20          Jose Wolf   [address] [address]           Dear Jose Wolf     Our records indicate that you have outstanding lab work and or testing that was ordered for you and has not yet been completed:  Lab Frequency

## (undated) NOTE — LETTER
Date & Time: 12/1/2022, 8:45 AM  Patient: Mario Joel Provider(s):    LILIA Shrestha       To Whom It May Concern:    Tess Washington was seen and treated in our department on 12/1/2022. He can return to work 10/05/2022.     If you have any questions or concerns, please do not hesitate to call.        _____________________________  Physician/APC Signature

## (undated) NOTE — LETTER
Date: 12/5/2022    Patient Name: Nikki Euceda          To Whom it may concern: This letter has been written at the patient's request. The above patient was seen at the [facility] for treatment of a medical condition. This patient should be excused from attending work  from 12/1/22 through 12/7/22. The patient may return to work  on 12/8/22.         Sincerely,          Francisco Garza, DO

## (undated) NOTE — LETTER
Date & Time: 9/26/2022, 10:44 AM  Patient: Marino Bray  Encounter Provider(s):    LILIA Bob       To Whom It May Concern:    Libby Diaz was seen and treated in our department on 9/26/2022. He may return to work 9/30/2022 if symptoms have improved.     If you have any questions or concerns, please do not hesitate to call.        _____________________________  Physician/APC Signature

## (undated) NOTE — MR AVS SNAPSHOT
[facility]  [address] [address]  [phone]               Thank you for choosing us for your health care visit with Bradly Anderson. Van Daniels DO.   We are glad to serve you and happy to provide you with this sum